# Patient Record
Sex: MALE | Race: WHITE | Employment: OTHER | URBAN - METROPOLITAN AREA
[De-identification: names, ages, dates, MRNs, and addresses within clinical notes are randomized per-mention and may not be internally consistent; named-entity substitution may affect disease eponyms.]

---

## 2017-02-21 ENCOUNTER — TELEPHONE (OUTPATIENT)
Dept: SURGERY | Facility: CLINIC | Age: 62
End: 2017-02-21

## 2017-02-21 NOTE — TELEPHONE ENCOUNTER
Noy Quevedo,     Your last visit with a Registered Dietitian was 4/21/2016 and you are due for follow-up. If you would like to make an appointment with me, please use the attached template or give the clinic a call at [phone].     If you would like to

## 2017-05-15 ENCOUNTER — OFFICE VISIT (OUTPATIENT)
Dept: INTERNAL MEDICINE CLINIC | Facility: CLINIC | Age: 62
End: 2017-05-15

## 2017-05-15 VITALS
TEMPERATURE: 98 F | WEIGHT: 315 LBS | RESPIRATION RATE: 16 BRPM | BODY MASS INDEX: 44.1 KG/M2 | HEIGHT: 71 IN | OXYGEN SATURATION: 98 % | HEART RATE: 67 BPM | DIASTOLIC BLOOD PRESSURE: 80 MMHG | SYSTOLIC BLOOD PRESSURE: 122 MMHG

## 2017-05-15 DIAGNOSIS — I48.92 ATRIAL FLUTTER, UNSPECIFIED TYPE (HCC): ICD-10-CM

## 2017-05-15 DIAGNOSIS — Z79.01 ANTICOAGULATED ON COUMADIN: ICD-10-CM

## 2017-05-15 DIAGNOSIS — Z00.00 ENCOUNTER FOR PREVENTATIVE ADULT HEALTH CARE EXAMINATION: Primary | ICD-10-CM

## 2017-05-15 DIAGNOSIS — S81.801S NON-HEALING WOUND OF LOWER EXTREMITY, RIGHT, SEQUELA: ICD-10-CM

## 2017-05-15 PROCEDURE — G0438 PPPS, INITIAL VISIT: HCPCS | Performed by: INTERNAL MEDICINE

## 2017-05-15 PROCEDURE — 99396 PREV VISIT EST AGE 40-64: CPT | Performed by: INTERNAL MEDICINE

## 2017-05-15 NOTE — PROGRESS NOTES
Quinton Amos is a 62year old male. HPI:   Patient presents with:  Physical  Patient presents for CPX/wellness examination. Last physical was over a year ago. Tries to watch what he eats. Down almost 100 lbs since gastric surgery last year.    Oc tablet, Rfl: 11  •  ergocalciferol 40467 UNITS Oral Cap, Take 1 capsule by mouth every 7 days. Takes on sundays, Disp: , Rfl: 1  •  omeprazole (PRILOSEC) 20 MG Oral Capsule Delayed Release, Take 20 mg by mouth 2 (two) times daily as needed.   , Disp: , Rfl: nourished,in no apparent distress  SKIN: superficial circular wound right hip/thigh area, small amount of drainage  HEENT: atraumatic, PERRLA, EOMI, normal lid and conjunctiva  LUNGS: clear to auscultation bilaterally, no wheezing/rubs  CARDIO: RRR without

## 2017-05-15 NOTE — PATIENT INSTRUCTIONS
- Get your screening blood work done when you are fasting  - Follow up with Dr. Javid Contreras (Vascular Surgery/Wound care) for your surgical wound  - Follow up with Dr. Abe Ruiz as scheduled. It was a pleasure seeing you in the clinic today.   Thank you for lalito

## 2017-05-16 ENCOUNTER — LAB ENCOUNTER (OUTPATIENT)
Dept: LAB | Age: 62
End: 2017-05-16
Attending: INTERNAL MEDICINE
Payer: COMMERCIAL

## 2017-05-16 DIAGNOSIS — Z00.00 ENCOUNTER FOR PREVENTATIVE ADULT HEALTH CARE EXAMINATION: ICD-10-CM

## 2017-05-16 PROCEDURE — 36415 COLL VENOUS BLD VENIPUNCTURE: CPT

## 2017-05-16 PROCEDURE — 85025 COMPLETE CBC W/AUTO DIFF WBC: CPT

## 2017-05-16 PROCEDURE — 80053 COMPREHEN METABOLIC PANEL: CPT

## 2017-05-16 PROCEDURE — 80061 LIPID PANEL: CPT

## 2017-05-16 PROCEDURE — 83036 HEMOGLOBIN GLYCOSYLATED A1C: CPT

## 2017-05-18 ENCOUNTER — TELEPHONE (OUTPATIENT)
Dept: INTERNAL MEDICINE CLINIC | Facility: CLINIC | Age: 62
End: 2017-05-18

## 2017-05-18 DIAGNOSIS — R80.9 MICROALBUMINURIA: Primary | ICD-10-CM

## 2017-05-18 NOTE — TELEPHONE ENCOUNTER
We typically would check that once every twelve months.   The main treatment for the protein in the urine is management of blood glucose (his A1C was normal), monitoring his kidney function (it was normal), control of blood pressure (his blood pressure is n

## 2017-05-18 NOTE — TELEPHONE ENCOUNTER
Pt called inquiring why Microalbumin or (urine test) was not order on last lab orders. Pt inquiring as last test was positive for protein. Please advise.

## 2017-05-25 ENCOUNTER — TELEPHONE (OUTPATIENT)
Dept: SURGERY | Facility: CLINIC | Age: 62
End: 2017-05-25

## 2017-05-25 NOTE — TELEPHONE ENCOUNTER
Spoke with pt regarding 1 yr post up  f/u with dietitian. Pt unable to schedule at time of call; plans to call back to schedule next appontment.

## 2017-05-25 NOTE — TELEPHONE ENCOUNTER
Spoke with pt on 5/22/17 as he is due for his 1 yr RD f/u. Pt did not have his schedule at time of call and requested to call back to reschedule.

## 2017-08-07 RX ORDER — METOPROLOL TARTRATE 100 MG/1
100 TABLET ORAL
Qty: 60 TABLET | Refills: 2 | Status: SHIPPED | OUTPATIENT
Start: 2017-08-07 | End: 2017-12-07

## 2017-10-19 ENCOUNTER — PRIOR ORIGINAL RECORDS (OUTPATIENT)
Dept: OTHER | Age: 62
End: 2017-10-19

## 2017-10-30 ENCOUNTER — LAB ENCOUNTER (OUTPATIENT)
Dept: LAB | Age: 62
End: 2017-10-30
Attending: INTERNAL MEDICINE
Payer: COMMERCIAL

## 2017-10-30 ENCOUNTER — PRIOR ORIGINAL RECORDS (OUTPATIENT)
Dept: OTHER | Age: 62
End: 2017-10-30

## 2017-10-30 DIAGNOSIS — E78.2 MIXED HYPERLIPIDEMIA: Primary | ICD-10-CM

## 2017-10-30 PROCEDURE — 80061 LIPID PANEL: CPT

## 2017-10-30 PROCEDURE — 36415 COLL VENOUS BLD VENIPUNCTURE: CPT

## 2017-10-31 ENCOUNTER — PRIOR ORIGINAL RECORDS (OUTPATIENT)
Dept: OTHER | Age: 62
End: 2017-10-31

## 2017-11-16 LAB
CHOLESTEROL, TOTAL: 172 MG/DL
HDL CHOLESTEROL: 51 MG/DL
LDL CHOLESTEROL: 95 MG/DL
TRIGLYCERIDES: 130 MG/DL

## 2017-12-11 RX ORDER — METOPROLOL TARTRATE 100 MG/1
TABLET ORAL
Qty: 180 TABLET | Refills: 0 | Status: ON HOLD | OUTPATIENT
Start: 2017-12-11 | End: 2018-12-14

## 2017-12-11 NOTE — TELEPHONE ENCOUNTER
Medication passed protocol. Requesting Metoprolol tartrate 100 mg tabs  LOV: 5/15/17 wellness OV with Dr. Syed Hagan  RTC: 6 months  Last Relevant Labs: 5/16/17  Filled: Last sent as 60 with 2 refills on 8/7/17.      Future Appointments  Date Time Provider

## 2017-12-19 ENCOUNTER — OFFICE VISIT (OUTPATIENT)
Dept: INTERNAL MEDICINE CLINIC | Facility: CLINIC | Age: 62
End: 2017-12-19

## 2017-12-19 VITALS
WEIGHT: 315 LBS | RESPIRATION RATE: 20 BRPM | TEMPERATURE: 98 F | DIASTOLIC BLOOD PRESSURE: 76 MMHG | BODY MASS INDEX: 44.1 KG/M2 | HEART RATE: 80 BPM | SYSTOLIC BLOOD PRESSURE: 134 MMHG | HEIGHT: 71 IN

## 2017-12-19 DIAGNOSIS — E66.01 MORBID OBESITY WITH BMI OF 45.0-49.9, ADULT (HCC): ICD-10-CM

## 2017-12-19 DIAGNOSIS — Z98.890 S/P ABLATION OF ATRIAL FLUTTER: ICD-10-CM

## 2017-12-19 DIAGNOSIS — M15.9 PRIMARY OSTEOARTHRITIS INVOLVING MULTIPLE JOINTS: ICD-10-CM

## 2017-12-19 DIAGNOSIS — Z86.79 S/P ABLATION OF ATRIAL FLUTTER: ICD-10-CM

## 2017-12-19 DIAGNOSIS — Z79.01 ANTICOAGULATED ON COUMADIN: ICD-10-CM

## 2017-12-19 DIAGNOSIS — R80.9 POSITIVE FOR MICROALBUMINURIA: ICD-10-CM

## 2017-12-19 DIAGNOSIS — I10 ESSENTIAL HYPERTENSION: Primary | ICD-10-CM

## 2017-12-19 PROBLEM — Z28.21 INFLUENZA VACCINE REFUSED: Status: ACTIVE | Noted: 2017-12-19

## 2017-12-19 PROCEDURE — 99214 OFFICE O/P EST MOD 30 MIN: CPT | Performed by: INTERNAL MEDICINE

## 2017-12-19 NOTE — PROGRESS NOTES
Patient presents with: Follow - Up: 6 month follow up for chronic medical issues      HPI: The pt presents today for 6-month f/u chronic medical conditions as follows:    1. HTN - Stable on prescription medication. No new issues. Due for updated labs. omeprazole (PRILOSEC) 20 MG Oral Capsule Delayed Release, Take 20 mg by mouth 2 (two) times daily as needed.   , Disp: , Rfl:     Smoking status: Former Smoker                                                              Packs/day: 0.10      Years: 0.00 MD  Diplomate, American Board of Internal Medicine  [facility]  [address] [facility],[address], [address], [facility], [address] [place] [address]  T: [phone]; F: [address]       Orders Placed This Encounter      LIPID PANEL - CPX      COMP METABOLIC PANEL - CPX      H

## 2017-12-20 ENCOUNTER — PRIOR ORIGINAL RECORDS (OUTPATIENT)
Dept: OTHER | Age: 62
End: 2017-12-20

## 2017-12-20 ENCOUNTER — APPOINTMENT (OUTPATIENT)
Dept: LAB | Age: 62
End: 2017-12-20
Attending: INTERNAL MEDICINE
Payer: COMMERCIAL

## 2017-12-20 DIAGNOSIS — R80.9 MICROALBUMINURIA: ICD-10-CM

## 2017-12-20 DIAGNOSIS — R80.9 POSITIVE FOR MICROALBUMINURIA: ICD-10-CM

## 2017-12-20 DIAGNOSIS — I10 ESSENTIAL HYPERTENSION: ICD-10-CM

## 2017-12-20 PROCEDURE — 82570 ASSAY OF URINE CREATININE: CPT

## 2017-12-20 PROCEDURE — 80061 LIPID PANEL: CPT

## 2017-12-20 PROCEDURE — 83036 HEMOGLOBIN GLYCOSYLATED A1C: CPT

## 2017-12-20 PROCEDURE — 80053 COMPREHEN METABOLIC PANEL: CPT

## 2017-12-20 PROCEDURE — 36415 COLL VENOUS BLD VENIPUNCTURE: CPT

## 2017-12-20 PROCEDURE — 82043 UR ALBUMIN QUANTITATIVE: CPT

## 2017-12-21 ENCOUNTER — TELEPHONE (OUTPATIENT)
Dept: INTERNAL MEDICINE CLINIC | Facility: CLINIC | Age: 62
End: 2017-12-21

## 2017-12-21 NOTE — TELEPHONE ENCOUNTER
Patient wanted to know about his liver test and they were cleared through my chart by patient. Patient is aware of results and clarified with patient.

## 2018-01-04 ENCOUNTER — HOSPITAL ENCOUNTER (OUTPATIENT)
Dept: CV DIAGNOSTICS | Age: 63
Discharge: HOME | End: 2018-01-04
Attending: INTERNAL MEDICINE
Payer: COMMERCIAL

## 2018-01-04 DIAGNOSIS — I48.91 ATRIAL FIBRILLATION (HCC): ICD-10-CM

## 2018-01-04 DIAGNOSIS — T81.718A IATROGENIC PULMONARY EMBOLISM AND INFARCTION (HCC): ICD-10-CM

## 2018-01-04 DIAGNOSIS — I26.99 IATROGENIC PULMONARY EMBOLISM AND INFARCTION (HCC): ICD-10-CM

## 2018-01-04 LAB — POC INR: 4.8 (ref 0.8–1.3)

## 2018-01-04 PROCEDURE — 85610 PROTHROMBIN TIME: CPT

## 2018-01-05 NOTE — TELEPHONE ENCOUNTER
Ask patient if he'd like to switch to Viagra, as it's now gone generic. Alvaro Cornejo. Amira Lawrence MD  Diplomate, American Board of Internal Medicine  [facility]  [address]  [facility],4Th Floor, [address], [facility], [address] [place] [address]  T: [phone]; F: [address]

## 2018-01-09 ENCOUNTER — HOSPITAL ENCOUNTER (OUTPATIENT)
Dept: LAB | Facility: HOSPITAL | Age: 63
Discharge: HOME | End: 2018-01-09
Attending: INTERNAL MEDICINE
Payer: COMMERCIAL

## 2018-01-09 DIAGNOSIS — I48.91 ATRIAL FIBRILLATION (HCC): ICD-10-CM

## 2018-01-09 DIAGNOSIS — I26.99 IATROGENIC PULMONARY EMBOLISM AND INFARCTION (HCC): ICD-10-CM

## 2018-01-09 DIAGNOSIS — T81.718A IATROGENIC PULMONARY EMBOLISM AND INFARCTION (HCC): ICD-10-CM

## 2018-01-09 LAB — POC INR: 1.3 (ref 0.8–1.3)

## 2018-01-09 PROCEDURE — 85610 PROTHROMBIN TIME: CPT

## 2018-01-10 NOTE — TELEPHONE ENCOUNTER
Tell him generic Viagra sent to pharmacy. Carley Pan. Beba Santo MD  Diplomate, American Board of Internal Medicine  [facility]  [address] [facility],4Th Floor, [address], [facility], [address]  T: [phone]; F: [address]

## 2018-01-12 ENCOUNTER — TELEPHONE (OUTPATIENT)
Dept: INTERNAL MEDICINE CLINIC | Facility: CLINIC | Age: 63
End: 2018-01-12

## 2018-01-12 NOTE — TELEPHONE ENCOUNTER
Done.    Ej Argueta MD  Diplomate, American Board of Internal Medicine  [facility]  [address] [facility],4Th Floor, [address], [facility], [address] [place] [address]  T: [phone]; F: [address]

## 2018-01-12 NOTE — TELEPHONE ENCOUNTER
Pt stating Sildenafil 100 mg is going to cost pt $800.00. Pt requesting  Sildenafil 20 mg tablets #24  2 1/2 tablets as needed and it will cost him $15.00 out of pocket.

## 2018-01-12 NOTE — TELEPHONE ENCOUNTER
Patient would like to speak with nurse in regards to medication. Patient did not explain.  Please call patient back

## 2018-01-15 ENCOUNTER — HOSPITAL ENCOUNTER (OUTPATIENT)
Dept: LAB | Facility: HOSPITAL | Age: 63
Discharge: HOME | End: 2018-01-15
Attending: INTERNAL MEDICINE
Payer: COMMERCIAL

## 2018-01-15 DIAGNOSIS — I26.99 IATROGENIC PULMONARY EMBOLISM AND INFARCTION (HCC): ICD-10-CM

## 2018-01-15 DIAGNOSIS — I48.91 ATRIAL FIBRILLATION (HCC): ICD-10-CM

## 2018-01-15 DIAGNOSIS — T81.718A IATROGENIC PULMONARY EMBOLISM AND INFARCTION (HCC): ICD-10-CM

## 2018-01-15 LAB — POC INR: 1.9 (ref 0.8–1.3)

## 2018-01-15 PROCEDURE — 85610 PROTHROMBIN TIME: CPT

## 2018-01-24 ENCOUNTER — PRIOR ORIGINAL RECORDS (OUTPATIENT)
Dept: OTHER | Age: 63
End: 2018-01-24

## 2018-01-25 ENCOUNTER — HOSPITAL ENCOUNTER (OUTPATIENT)
Dept: LAB | Facility: HOSPITAL | Age: 63
Discharge: HOME | End: 2018-01-25
Attending: INTERNAL MEDICINE
Payer: COMMERCIAL

## 2018-01-25 DIAGNOSIS — I48.91 ATRIAL FIBRILLATION (HCC): ICD-10-CM

## 2018-01-25 DIAGNOSIS — T81.718A IATROGENIC PULMONARY EMBOLISM AND INFARCTION (HCC): ICD-10-CM

## 2018-01-25 DIAGNOSIS — I26.99 IATROGENIC PULMONARY EMBOLISM AND INFARCTION (HCC): ICD-10-CM

## 2018-01-25 LAB — POC INR: 4.6 (ref 0.8–1.3)

## 2018-01-25 PROCEDURE — 85610 PROTHROMBIN TIME: CPT

## 2018-02-12 ENCOUNTER — HOSPITAL ENCOUNTER (OUTPATIENT)
Dept: LAB | Facility: HOSPITAL | Age: 63
Discharge: HOME | End: 2018-02-12
Attending: INTERNAL MEDICINE
Payer: COMMERCIAL

## 2018-02-12 DIAGNOSIS — I26.99 IATROGENIC PULMONARY EMBOLISM AND INFARCTION (HCC): ICD-10-CM

## 2018-02-12 DIAGNOSIS — T81.718A IATROGENIC PULMONARY EMBOLISM AND INFARCTION (HCC): ICD-10-CM

## 2018-02-12 DIAGNOSIS — I48.91 ATRIAL FIBRILLATION (HCC): ICD-10-CM

## 2018-02-12 LAB — POC INR: 2.2 (ref 0.8–1.3)

## 2018-02-12 PROCEDURE — 85610 PROTHROMBIN TIME: CPT

## 2018-02-22 DIAGNOSIS — F52.8 PSYCHOSEXUAL DYSFUNCTION WITH INHIBITED SEXUAL EXCITEMENT: ICD-10-CM

## 2018-02-22 RX ORDER — SILDENAFIL CITRATE 20 MG/1
50 TABLET ORAL
Qty: 24 TABLET | Refills: 0 | Status: CANCELLED | OUTPATIENT
Start: 2018-02-22

## 2018-02-23 ENCOUNTER — PATIENT MESSAGE (OUTPATIENT)
Dept: INTERNAL MEDICINE CLINIC | Facility: CLINIC | Age: 63
End: 2018-02-23

## 2018-02-26 NOTE — TELEPHONE ENCOUNTER
From: Nicho Franco  To: Carlos Hassan MD  Sent: 2/23/2018 5:43 PM CST  Subject: Other    Doctor Cathryn Kumar, I have tried three days in a row, Feb. 21, Feb.22, and today to have a renewal of my prescription for Sildenafil (20mg).  I have spoken with assistants

## 2018-02-28 NOTE — TELEPHONE ENCOUNTER
Call placed to pt and he stated he has been waiting for a written Rx for Sildenafil. Willing to use a [address] It was explained to pt Dr Clarisse Severino is out of the office today and the message would be sent to him for Rx pick-up tomorrow.

## 2018-03-01 ENCOUNTER — TELEPHONE (OUTPATIENT)
Dept: INTERNAL MEDICINE CLINIC | Facility: CLINIC | Age: 63
End: 2018-03-01

## 2018-03-01 NOTE — TELEPHONE ENCOUNTER
Script in my Outbox. Jyl Aid. Lissett Ward MD  Diplomate, American Board of Internal Medicine  [facility]  [address], [facility] & [facility], [address]  T: [phone]; F: [address]

## 2018-03-05 ENCOUNTER — HOSPITAL ENCOUNTER (OUTPATIENT)
Dept: LAB | Facility: HOSPITAL | Age: 63
Discharge: HOME | End: 2018-03-05
Attending: INTERNAL MEDICINE
Payer: COMMERCIAL

## 2018-03-05 DIAGNOSIS — T81.718A IATROGENIC PULMONARY EMBOLISM AND INFARCTION (HCC): ICD-10-CM

## 2018-03-05 DIAGNOSIS — I48.91 ATRIAL FIBRILLATION (HCC): ICD-10-CM

## 2018-03-05 DIAGNOSIS — I26.99 IATROGENIC PULMONARY EMBOLISM AND INFARCTION (HCC): ICD-10-CM

## 2018-03-05 LAB — POC INR: 2.9 (ref 0.8–1.3)

## 2018-03-05 PROCEDURE — 85610 PROTHROMBIN TIME: CPT

## 2018-04-13 ENCOUNTER — HOSPITAL ENCOUNTER (OUTPATIENT)
Dept: LAB | Facility: HOSPITAL | Age: 63
Discharge: HOME | End: 2018-04-13
Attending: INTERNAL MEDICINE
Payer: COMMERCIAL

## 2018-04-13 DIAGNOSIS — I26.99 IATROGENIC PULMONARY EMBOLISM AND INFARCTION (HCC): ICD-10-CM

## 2018-04-13 DIAGNOSIS — T81.718A IATROGENIC PULMONARY EMBOLISM AND INFARCTION (HCC): ICD-10-CM

## 2018-04-13 DIAGNOSIS — I48.91 ATRIAL FIBRILLATION (HCC): ICD-10-CM

## 2018-04-13 PROCEDURE — 85610 PROTHROMBIN TIME: CPT

## 2018-04-13 PROCEDURE — 36415 COLL VENOUS BLD VENIPUNCTURE: CPT

## 2018-04-25 ENCOUNTER — HOSPITAL ENCOUNTER (OUTPATIENT)
Dept: LAB | Facility: HOSPITAL | Age: 63
Discharge: HOME | End: 2018-04-25
Attending: INTERNAL MEDICINE
Payer: COMMERCIAL

## 2018-04-25 DIAGNOSIS — T81.718A IATROGENIC PULMONARY EMBOLISM AND INFARCTION (HCC): ICD-10-CM

## 2018-04-25 DIAGNOSIS — I48.91 ATRIAL FIBRILLATION (HCC): ICD-10-CM

## 2018-04-25 DIAGNOSIS — I26.99 IATROGENIC PULMONARY EMBOLISM AND INFARCTION (HCC): ICD-10-CM

## 2018-04-25 PROCEDURE — 85610 PROTHROMBIN TIME: CPT

## 2018-05-08 ENCOUNTER — HOSPITAL ENCOUNTER (OUTPATIENT)
Dept: LAB | Facility: HOSPITAL | Age: 63
Discharge: HOME | End: 2018-05-08
Attending: INTERNAL MEDICINE
Payer: COMMERCIAL

## 2018-05-08 DIAGNOSIS — I26.99 IATROGENIC PULMONARY EMBOLISM AND INFARCTION (HCC): ICD-10-CM

## 2018-05-08 DIAGNOSIS — I48.91 ATRIAL FIBRILLATION (HCC): ICD-10-CM

## 2018-05-08 DIAGNOSIS — T81.718A IATROGENIC PULMONARY EMBOLISM AND INFARCTION (HCC): ICD-10-CM

## 2018-05-08 PROCEDURE — 85610 PROTHROMBIN TIME: CPT

## 2018-05-15 ENCOUNTER — HOSPITAL ENCOUNTER (OUTPATIENT)
Dept: LAB | Facility: HOSPITAL | Age: 63
Discharge: HOME | End: 2018-05-15
Attending: INTERNAL MEDICINE
Payer: COMMERCIAL

## 2018-05-15 DIAGNOSIS — I26.99 IATROGENIC PULMONARY EMBOLISM AND INFARCTION (HCC): ICD-10-CM

## 2018-05-15 DIAGNOSIS — I48.91 ATRIAL FIBRILLATION (HCC): ICD-10-CM

## 2018-05-15 DIAGNOSIS — T81.718A IATROGENIC PULMONARY EMBOLISM AND INFARCTION (HCC): ICD-10-CM

## 2018-05-15 PROCEDURE — 85610 PROTHROMBIN TIME: CPT

## 2018-05-17 ENCOUNTER — TELEPHONE (OUTPATIENT)
Dept: SURGERY | Facility: CLINIC | Age: 63
End: 2018-05-17

## 2018-05-17 NOTE — TELEPHONE ENCOUNTER
Left reminder message for patient to schedule 2 year post-op follow up with bariatric surgeon, bariatrician and dietitian.

## 2018-06-07 ENCOUNTER — HOSPITAL ENCOUNTER (OUTPATIENT)
Dept: LAB | Facility: HOSPITAL | Age: 63
Discharge: HOME | End: 2018-06-07
Attending: INTERNAL MEDICINE
Payer: COMMERCIAL

## 2018-06-07 DIAGNOSIS — I26.99 IATROGENIC PULMONARY EMBOLISM AND INFARCTION (HCC): ICD-10-CM

## 2018-06-07 DIAGNOSIS — I48.91 ATRIAL FIBRILLATION (HCC): ICD-10-CM

## 2018-06-07 DIAGNOSIS — T81.718A IATROGENIC PULMONARY EMBOLISM AND INFARCTION (HCC): ICD-10-CM

## 2018-06-07 PROCEDURE — 85610 PROTHROMBIN TIME: CPT

## 2018-06-28 ENCOUNTER — HOSPITAL ENCOUNTER (OUTPATIENT)
Dept: LAB | Facility: HOSPITAL | Age: 63
Discharge: HOME | End: 2018-06-28
Attending: INTERNAL MEDICINE
Payer: COMMERCIAL

## 2018-06-28 DIAGNOSIS — I26.99 IATROGENIC PULMONARY EMBOLISM AND INFARCTION (HCC): ICD-10-CM

## 2018-06-28 DIAGNOSIS — T81.718A IATROGENIC PULMONARY EMBOLISM AND INFARCTION (HCC): ICD-10-CM

## 2018-06-28 DIAGNOSIS — I48.91 ATRIAL FIBRILLATION (HCC): ICD-10-CM

## 2018-06-28 LAB — POC INR: 2.7 (ref 0.8–1.3)

## 2018-06-28 PROCEDURE — 85610 PROTHROMBIN TIME: CPT

## 2018-07-13 ENCOUNTER — PRIOR ORIGINAL RECORDS (OUTPATIENT)
Dept: OTHER | Age: 63
End: 2018-07-13

## 2018-07-17 ENCOUNTER — PRIOR ORIGINAL RECORDS (OUTPATIENT)
Dept: OTHER | Age: 63
End: 2018-07-17

## 2018-07-17 ENCOUNTER — TELEPHONE (OUTPATIENT)
Dept: INTERNAL MEDICINE CLINIC | Facility: CLINIC | Age: 63
End: 2018-07-17

## 2018-07-17 DIAGNOSIS — Z79.01 ANTICOAGULATED ON COUMADIN: ICD-10-CM

## 2018-07-17 DIAGNOSIS — I48.92 ATRIAL FLUTTER, UNSPECIFIED TYPE (HCC): Primary | ICD-10-CM

## 2018-07-17 LAB
ALBUMIN: 3.4 G/DL
ALKALINE PHOSPHATATE(ALK PHOS): 93 IU/L
BILIRUBIN TOTAL: 0.5 MG/DL
BUN: 13 MG/DL
CALCIUM: 9.2 MG/DL
CHLORIDE: 105 MEQ/L
CHOLESTEROL, TOTAL: 204 MG/DL
CREATININE, SERUM: 0.71 MG/DL
GLUCOSE: 96 MG/DL
HDL CHOLESTEROL: 44 MG/DL
HEMOGLOBIN A1C: 6 %
LDL CHOLESTEROL: 88 MG/DL
POTASSIUM, SERUM: 4 MEQ/L
PROTEIN, TOTAL: 7.7 G/DL
SGOT (AST): 14 IU/L
SGPT (ALT): 23 IU/L
SODIUM: 140 MEQ/L
TRIGLYCERIDES: 360 MG/DL

## 2018-07-17 NOTE — TELEPHONE ENCOUNTER
Patient calling in requesting a referral to see Cardiologist, Dr. Alessandra Patterson. Patient is at the office now and was not aware his referral had . Please place referral entry. Call pt with any questions.

## 2018-07-18 ENCOUNTER — PRIOR ORIGINAL RECORDS (OUTPATIENT)
Dept: OTHER | Age: 63
End: 2018-07-18

## 2018-07-18 ENCOUNTER — LAB ENCOUNTER (OUTPATIENT)
Dept: LAB | Age: 63
End: 2018-07-18
Attending: INTERNAL MEDICINE
Payer: COMMERCIAL

## 2018-07-18 DIAGNOSIS — I48.91 ATRIAL FIBRILLATION (HCC): ICD-10-CM

## 2018-07-18 DIAGNOSIS — I10 ESSENTIAL HYPERTENSION, MALIGNANT: Primary | ICD-10-CM

## 2018-07-18 LAB
BASOPHILS # BLD AUTO: 0.05 X10(3) UL (ref 0–0.1)
BASOPHILS NFR BLD AUTO: 0.6 %
BUN BLD-MCNC: 10 MG/DL (ref 8–20)
CALCIUM BLD-MCNC: 8.8 MG/DL (ref 8.3–10.3)
CHLORIDE: 106 MMOL/L (ref 101–111)
CO2: 24 MMOL/L (ref 22–32)
CREAT BLD-MCNC: 0.92 MG/DL (ref 0.7–1.3)
EOSINOPHIL # BLD AUTO: 0.15 X10(3) UL (ref 0–0.3)
EOSINOPHIL NFR BLD AUTO: 1.9 %
ERYTHROCYTE [DISTWIDTH] IN BLOOD BY AUTOMATED COUNT: 15.8 % (ref 11.5–16)
GLUCOSE BLD-MCNC: 101 MG/DL (ref 70–99)
HAV IGM SER QL: 2.1 MG/DL (ref 1.8–2.5)
HCT VFR BLD AUTO: 41.8 % (ref 37–53)
HGB BLD-MCNC: 12.5 G/DL (ref 13–17)
IMMATURE GRANULOCYTE COUNT: 0.02 X10(3) UL (ref 0–1)
IMMATURE GRANULOCYTE RATIO %: 0.3 %
LYMPHOCYTES # BLD AUTO: 1.16 X10(3) UL (ref 0.9–4)
LYMPHOCYTES NFR BLD AUTO: 14.9 %
MCH RBC QN AUTO: 26.7 PG (ref 27–33.2)
MCHC RBC AUTO-ENTMCNC: 29.9 G/DL (ref 31–37)
MCV RBC AUTO: 89.3 FL (ref 80–99)
MONOCYTES # BLD AUTO: 0.66 X10(3) UL (ref 0.1–1)
MONOCYTES NFR BLD AUTO: 8.5 %
NEUTROPHIL ABS PRELIM: 5.76 X10 (3) UL (ref 1.3–6.7)
NEUTROPHILS # BLD AUTO: 5.76 X10(3) UL (ref 1.3–6.7)
NEUTROPHILS NFR BLD AUTO: 73.8 %
PLATELET # BLD AUTO: 481 10(3)UL (ref 150–450)
POTASSIUM SERPL-SCNC: 4.1 MMOL/L (ref 3.6–5.1)
RBC # BLD AUTO: 4.68 X10(6)UL (ref 4.3–5.7)
RED CELL DISTRIBUTION WIDTH-SD: 51.1 FL (ref 35.1–46.3)
SODIUM SERPL-SCNC: 139 MMOL/L (ref 136–144)
TSI SER-ACNC: 1.27 MIU/ML (ref 0.35–5.5)
WBC # BLD AUTO: 7.8 X10(3) UL (ref 4–13)

## 2018-07-18 PROCEDURE — 84443 ASSAY THYROID STIM HORMONE: CPT

## 2018-07-18 PROCEDURE — 85025 COMPLETE CBC W/AUTO DIFF WBC: CPT

## 2018-07-18 PROCEDURE — 80048 BASIC METABOLIC PNL TOTAL CA: CPT

## 2018-07-18 PROCEDURE — 83735 ASSAY OF MAGNESIUM: CPT

## 2018-07-18 PROCEDURE — 36415 COLL VENOUS BLD VENIPUNCTURE: CPT

## 2018-07-20 ENCOUNTER — PRIOR ORIGINAL RECORDS (OUTPATIENT)
Dept: OTHER | Age: 63
End: 2018-07-20

## 2018-07-20 LAB
BUN: 10 MG/DL
CALCIUM: 8.8 MG/DL
CHLORIDE: 106 MEQ/L
CREATININE, SERUM: 0.92 MG/DL
GLUCOSE: 101 MG/DL
HEMATOCRIT: 41.8 %
HEMOGLOBIN: 12.5 G/DL
MAGNESIUM: 2.1 MG/DL
PLATELETS: 481 K/UL
POTASSIUM, SERUM: 4.1 MEQ/L
RED BLOOD COUNT: 4.68 X 10-6/U
SODIUM: 139 MEQ/L
THYROID STIMULATING HORMONE: 1.27 MLU/L
WHITE BLOOD COUNT: 7.8 X 10-3/U

## 2018-07-30 ENCOUNTER — APPOINTMENT (OUTPATIENT)
Dept: GENERAL RADIOLOGY | Age: 63
End: 2018-07-30
Attending: EMERGENCY MEDICINE
Payer: COMMERCIAL

## 2018-07-30 ENCOUNTER — HOSPITAL ENCOUNTER (EMERGENCY)
Facility: HOSPITAL | Age: 63
Discharge: HOME | End: 2018-07-30
Attending: EMERGENCY MEDICINE
Payer: COMMERCIAL

## 2018-07-30 ENCOUNTER — APPOINTMENT (OUTPATIENT)
Dept: CT IMAGING | Facility: HOSPITAL | Age: 63
End: 2018-07-30
Attending: EMERGENCY MEDICINE
Payer: COMMERCIAL

## 2018-07-30 ENCOUNTER — TELEPHONE (OUTPATIENT)
Dept: INTERNAL MEDICINE CLINIC | Facility: CLINIC | Age: 63
End: 2018-07-30

## 2018-07-30 ENCOUNTER — HOSPITAL ENCOUNTER (OUTPATIENT)
Age: 63
Discharge: ANOTHER HOSPITAL | End: 2018-07-30
Attending: EMERGENCY MEDICINE
Payer: COMMERCIAL

## 2018-07-30 VITALS
TEMPERATURE: 98 F | DIASTOLIC BLOOD PRESSURE: 78 MMHG | RESPIRATION RATE: 28 BRPM | OXYGEN SATURATION: 95 % | SYSTOLIC BLOOD PRESSURE: 129 MMHG | HEART RATE: 96 BPM

## 2018-07-30 VITALS
BODY MASS INDEX: 44.1 KG/M2 | DIASTOLIC BLOOD PRESSURE: 83 MMHG | OXYGEN SATURATION: 93 % | RESPIRATION RATE: 27 BRPM | SYSTOLIC BLOOD PRESSURE: 134 MMHG | WEIGHT: 315 LBS | HEART RATE: 101 BPM | TEMPERATURE: 98 F | HEIGHT: 71 IN

## 2018-07-30 DIAGNOSIS — I48.92 ATRIAL FIBRILLATION AND FLUTTER (HCC): ICD-10-CM

## 2018-07-30 DIAGNOSIS — J18.9 PNEUMONIA OF BOTH LUNGS DUE TO INFECTIOUS ORGANISM, UNSPECIFIED PART OF LUNG: Primary | ICD-10-CM

## 2018-07-30 DIAGNOSIS — I48.91 ATRIAL FIBRILLATION AND FLUTTER (HCC): ICD-10-CM

## 2018-07-30 DIAGNOSIS — J18.9 COMMUNITY ACQUIRED PNEUMONIA OF RIGHT LUNG, UNSPECIFIED PART OF LUNG: Primary | ICD-10-CM

## 2018-07-30 LAB
#LYMPHOCYTE IC: 1 X10ˆ3/UL (ref 0.9–3.2)
#MXD IC: 1 X10ˆ3/UL (ref 0.1–1)
#NEUTROPHIL IC: 7.7 X10ˆ3/UL (ref 1.3–6.7)
ALBUMIN SERPL-MCNC: 2.9 G/DL (ref 3.5–4.8)
ALBUMIN/GLOB SERPL: 0.6 {RATIO} (ref 1–2)
ALP LIVER SERPL-CCNC: 158 U/L (ref 45–117)
ALT SERPL-CCNC: 18 U/L (ref 17–63)
ANION GAP SERPL CALC-SCNC: 9 MMOL/L (ref 0–18)
APTT PPP: 51.2 SECONDS (ref 26.1–34.6)
AST SERPL-CCNC: 16 U/L (ref 15–41)
ATRIAL RATE: 86 BPM
BASOPHILS # BLD AUTO: 0.07 X10(3) UL (ref 0–0.1)
BASOPHILS NFR BLD AUTO: 0.7 %
BILIRUB SERPL-MCNC: 1 MG/DL (ref 0.1–2)
BILIRUB UR QL STRIP.AUTO: NEGATIVE
BUN BLD-MCNC: 13 MG/DL (ref 8–20)
BUN/CREAT SERPL: 16.9 (ref 10–20)
CALCIUM BLD-MCNC: 8.7 MG/DL (ref 8.3–10.3)
CHLORIDE SERPL-SCNC: 103 MMOL/L (ref 101–111)
CLARITY UR REFRACT.AUTO: CLEAR
CO2 SERPL-SCNC: 25 MMOL/L (ref 22–32)
COLOR UR AUTO: YELLOW
CREAT BLD-MCNC: 0.77 MG/DL (ref 0.7–1.3)
CREAT SERPL-MCNC: 0.6 MG/DL (ref 0.7–1.3)
DDIMER WHOLE BLOOD: 394 NG/ML DDU (ref ?–400)
EOSINOPHIL # BLD AUTO: 0.18 X10(3) UL (ref 0–0.3)
EOSINOPHIL NFR BLD AUTO: 1.9 %
ERYTHROCYTE [DISTWIDTH] IN BLOOD BY AUTOMATED COUNT: 15.7 % (ref 11.5–16)
GLOBULIN PLAS-MCNC: 5.1 G/DL (ref 2.5–3.7)
GLUCOSE BLD-MCNC: 103 MG/DL (ref 70–99)
GLUCOSE BLD-MCNC: 120 MG/DL (ref 70–99)
GLUCOSE UR STRIP.AUTO-MCNC: NEGATIVE MG/DL
HCT IC: 38.5 % (ref 37–54)
HCT VFR BLD AUTO: 38.3 % (ref 37–53)
HGB BLD-MCNC: 11.7 G/DL (ref 13–17)
HGB IC: 11.9 G/DL (ref 13–17)
IMMATURE GRANULOCYTE COUNT: 0.03 X10(3) UL (ref 0–1)
IMMATURE GRANULOCYTE RATIO %: 0.3 %
INR BLD: 2.45 (ref 0.9–1.1)
ISTAT BLOOD GAS TCO2: 24 MMOL/L (ref 22–32)
ISTAT BUN: 13 MG/DL (ref 8–20)
ISTAT CHLORIDE: 102 MMOL/L (ref 101–111)
ISTAT HEMATOCRIT: 38 % (ref 37–53)
ISTAT IONIZED CALCIUM: 0.97 MMOL/L
ISTAT POTASSIUM: 3.9 MMOL/L (ref 3.6–5.1)
ISTAT SODIUM: 137 MMOL/L (ref 136–144)
ISTAT TROPONIN: <0.1 NG/ML (ref ?–0.1)
KETONES UR STRIP.AUTO-MCNC: NEGATIVE MG/DL
LEUKOCYTE ESTERASE UR QL STRIP.AUTO: NEGATIVE
LYMPHOCYTES # BLD AUTO: 0.94 X10(3) UL (ref 0.9–4)
LYMPHOCYTES NFR BLD AUTO: 10 %
LYMPHOCYTES NFR BLD AUTO: 10.2 %
M PROTEIN MFR SERPL ELPH: 8 G/DL (ref 6.1–8.3)
MCH IC: 27 PG (ref 27–33.2)
MCH RBC QN AUTO: 26.3 PG (ref 27–33.2)
MCHC IC: 30.9 G/DL (ref 31–37)
MCHC RBC AUTO-ENTMCNC: 30.5 G/DL (ref 31–37)
MCV IC: 87.5 FL (ref 80–99)
MCV RBC AUTO: 86.1 FL (ref 80–99)
MIXED CELL %: 10 %
MONOCYTES # BLD AUTO: 0.75 X10(3) UL (ref 0.1–1)
MONOCYTES NFR BLD AUTO: 8 %
NEUTROPHIL ABS PRELIM: 7.39 X10 (3) UL (ref 1.3–6.7)
NEUTROPHILS # BLD AUTO: 7.39 X10(3) UL (ref 1.3–6.7)
NEUTROPHILS NFR BLD AUTO: 79.1 %
NEUTROPHILS NFR BLD AUTO: 79.8 %
NITRITE UR QL STRIP.AUTO: NEGATIVE
OSMOLALITY SERPL CALC.SUM OF ELEC: 284 MOSM/KG (ref 275–295)
PH UR STRIP.AUTO: 7 [PH] (ref 4.5–8)
PLATELET # BLD AUTO: 372 10(3)UL (ref 150–450)
POTASSIUM SERPL-SCNC: 4.1 MMOL/L (ref 3.6–5.1)
PROCALCITONIN SERPL-MCNC: <0.11 NG/ML
PROT UR STRIP.AUTO-MCNC: 30 MG/DL
PSA SERPL DL<=0.01 NG/ML-MCNC: 27.4 SECONDS (ref 12.4–14.7)
Q-T INTERVAL: 396 MS
QRS DURATION: 154 MS
QTC CALCULATION (BEZET): 479 MS
R AXIS: 12 DEGREES
RBC # BLD AUTO: 4.45 X10(6)UL (ref 4.3–5.7)
RBC IC: 4.4 X10ˆ6/UL (ref 4.3–5.7)
RBC UR QL AUTO: NEGATIVE
RED CELL DISTRIBUTION WIDTH-SD: 48.9 FL (ref 35.1–46.3)
SODIUM SERPL-SCNC: 137 MMOL/L (ref 136–144)
SP GR UR STRIP.AUTO: 1.01 (ref 1–1.03)
T AXIS: -23 DEGREES
UROBILINOGEN UR STRIP.AUTO-MCNC: 2 MG/DL
VENTRICULAR RATE: 88 BPM
WBC # BLD AUTO: 9.4 X10(3) UL (ref 4–13)
WBC IC: 9.7 X10ˆ3/UL (ref 4–13)

## 2018-07-30 PROCEDURE — 93005 ELECTROCARDIOGRAM TRACING: CPT

## 2018-07-30 PROCEDURE — 80047 BASIC METABLC PNL IONIZED CA: CPT

## 2018-07-30 PROCEDURE — 85730 THROMBOPLASTIN TIME PARTIAL: CPT | Performed by: EMERGENCY MEDICINE

## 2018-07-30 PROCEDURE — 84145 PROCALCITONIN (PCT): CPT | Performed by: EMERGENCY MEDICINE

## 2018-07-30 PROCEDURE — 94640 AIRWAY INHALATION TREATMENT: CPT

## 2018-07-30 PROCEDURE — 84484 ASSAY OF TROPONIN QUANT: CPT

## 2018-07-30 PROCEDURE — 99285 EMERGENCY DEPT VISIT HI MDM: CPT

## 2018-07-30 PROCEDURE — 85025 COMPLETE CBC W/AUTO DIFF WBC: CPT | Performed by: EMERGENCY MEDICINE

## 2018-07-30 PROCEDURE — 85378 FIBRIN DEGRADE SEMIQUANT: CPT | Performed by: EMERGENCY MEDICINE

## 2018-07-30 PROCEDURE — 36415 COLL VENOUS BLD VENIPUNCTURE: CPT

## 2018-07-30 PROCEDURE — 87040 BLOOD CULTURE FOR BACTERIA: CPT | Performed by: EMERGENCY MEDICINE

## 2018-07-30 PROCEDURE — 71275 CT ANGIOGRAPHY CHEST: CPT | Performed by: EMERGENCY MEDICINE

## 2018-07-30 PROCEDURE — 71046 X-RAY EXAM CHEST 2 VIEWS: CPT | Performed by: EMERGENCY MEDICINE

## 2018-07-30 PROCEDURE — 81001 URINALYSIS AUTO W/SCOPE: CPT | Performed by: EMERGENCY MEDICINE

## 2018-07-30 PROCEDURE — 99215 OFFICE O/P EST HI 40 MIN: CPT

## 2018-07-30 PROCEDURE — 80053 COMPREHEN METABOLIC PANEL: CPT | Performed by: EMERGENCY MEDICINE

## 2018-07-30 PROCEDURE — 85610 PROTHROMBIN TIME: CPT | Performed by: EMERGENCY MEDICINE

## 2018-07-30 RX ORDER — LEVOFLOXACIN 500 MG/1
500 TABLET, FILM COATED ORAL DAILY
Qty: 10 TABLET | Refills: 0 | Status: SHIPPED | OUTPATIENT
Start: 2018-07-30 | End: 2018-08-09

## 2018-07-30 RX ORDER — IPRATROPIUM BROMIDE AND ALBUTEROL SULFATE 2.5; .5 MG/3ML; MG/3ML
3 SOLUTION RESPIRATORY (INHALATION) ONCE
Status: COMPLETED | OUTPATIENT
Start: 2018-07-30 | End: 2018-07-30

## 2018-07-30 NOTE — ED INITIAL ASSESSMENT (HPI)
Pt began with a chest cold 1 month ago, went to [facility], had issues with breathing, dizziness, and has history of cardiac issues.   Pt felt better when he returned home, but past 2 days it has gotten worse and he has a fever,   Saw cardiologist 1 week ago an

## 2018-07-30 NOTE — TELEPHONE ENCOUNTER
Spouse dropped off form to be completed by Physician.     Title of form: Certification for Parking Placard/License Plates

## 2018-07-30 NOTE — ED PROVIDER NOTES
Patient Seen in: [facility] Emergency Department    History   Patient presents with:  Cough    Stated Complaint: pneumonia from [facility]    HPI    18-year-old male presents emergency department who was sent from [address] with a with erosions/ulcers                immediately above GEJ (biopsies for intestinal                metaplasia taken w/out barretts)  No date: UPPER GI ENDOSCOPY,EXAM        Smoking status: Former Smoker All other components within normal limits   PTT, ACTIVATED - Abnormal; Notable for the following:     PTT 51.2 (*)     All other components within normal limits   PROTHROMBIN TIME (PT) - Abnormal; Notable for the following:     PT 27.4 (*)     INR 2.45 (*) 1456  ------------------------------------------------------------    Pulmonologist looked at the scan. This point he would like me to try a 10 day course of Levaquin. Patient will follow up with primary physician may need a repeat chest x-ray.   Patient

## 2018-07-30 NOTE — IMAGING NOTE
Patient presents to CT with a 20g right upper arm IV which is in the proximal upper half of the right arm. After injecting the patient for CT (6.0ml/sec), the area distal to the IV catheter is bruised.  I flushed it again with saline and did not get a good

## 2018-07-30 NOTE — ED INITIAL ASSESSMENT (HPI)
Patient sent from the [facility] with a diagnosis of pneumonia. Patient reports a nonproductive cough since July 1. He reports he was in [facility] and got back on August 12. He states the cough feels heavier since 4 or 5 days ago.  He states he had a fever of 100.7

## 2018-08-02 NOTE — TELEPHONE ENCOUNTER
Patient was upset that his paperwork has not been completed as yet. Stated he is paying for his health insurance and is not on welfare. Inquired why it could potentially take 7-14 business days when all the Dr needed to do was just sign the form.   This was

## 2018-08-07 NOTE — TELEPHONE ENCOUNTER
Called and left message informing him paperwork is ready for  and will be placed in the front of the office. Another copy was mailed to . Copy in blue folder.

## 2018-08-13 ENCOUNTER — HOSPITAL ENCOUNTER (OUTPATIENT)
Dept: LAB | Facility: HOSPITAL | Age: 63
Discharge: HOME | End: 2018-08-13
Attending: INTERNAL MEDICINE
Payer: COMMERCIAL

## 2018-08-13 LAB
INR BLD: 2.22 (ref 0.9–1.1)
PSA SERPL DL<=0.01 NG/ML-MCNC: 24.2 SECONDS (ref 12–14.1)

## 2018-08-13 PROCEDURE — 85610 PROTHROMBIN TIME: CPT | Performed by: INTERNAL MEDICINE

## 2018-08-13 PROCEDURE — 36415 COLL VENOUS BLD VENIPUNCTURE: CPT | Performed by: INTERNAL MEDICINE

## 2018-08-16 ENCOUNTER — OFFICE VISIT (OUTPATIENT)
Dept: INTERNAL MEDICINE CLINIC | Facility: CLINIC | Age: 63
End: 2018-08-16

## 2018-08-16 VITALS
DIASTOLIC BLOOD PRESSURE: 78 MMHG | WEIGHT: 315 LBS | TEMPERATURE: 98 F | BODY MASS INDEX: 44.1 KG/M2 | SYSTOLIC BLOOD PRESSURE: 112 MMHG | HEIGHT: 71 IN | HEART RATE: 82 BPM | OXYGEN SATURATION: 96 %

## 2018-08-16 DIAGNOSIS — J18.9 COMMUNITY ACQUIRED PNEUMONIA OF RIGHT MIDDLE LOBE OF LUNG: ICD-10-CM

## 2018-08-16 DIAGNOSIS — Z00.00 ROUTINE GENERAL MEDICAL EXAMINATION AT A HEALTH CARE FACILITY: Primary | ICD-10-CM

## 2018-08-16 DIAGNOSIS — I48.19 PERSISTENT ATRIAL FIBRILLATION (HCC): ICD-10-CM

## 2018-08-16 PROBLEM — Z98.890 S/P ABLATION OF ATRIAL FLUTTER: Status: RESOLVED | Noted: 2017-12-19 | Resolved: 2018-08-16

## 2018-08-16 PROBLEM — Z86.79 S/P ABLATION OF ATRIAL FLUTTER: Status: RESOLVED | Noted: 2017-12-19 | Resolved: 2018-08-16

## 2018-08-16 PROBLEM — E66.01 MORBID OBESITY WITH BMI OF 45.0-49.9, ADULT (HCC): Status: RESOLVED | Noted: 2017-12-19 | Resolved: 2018-08-16

## 2018-08-16 PROCEDURE — 99396 PREV VISIT EST AGE 40-64: CPT | Performed by: INTERNAL MEDICINE

## 2018-08-16 NOTE — PROGRESS NOTES
Patient presents with:  Physical      HPI: Jeni Oneill presents today for annual physical.  Due for select wellness labs. Health goals still center around longevity, vitality, and quality of life.   He was diagnosed w/ recent pneumonia and is due for surveillan Rfl: 0  •  METOPROLOL TARTRATE 100 MG Oral Tab, TAKE ONE TABLET BY MOUTH TWICE DAILY, Disp: 180 tablet, Rfl: 0  •  AmLODIPine Besylate 10 MG Oral Tab, Take 10 mg by mouth daily. , Disp: , Rfl:   •  LISINOPRIL 40 MG Oral Tab, TAKE ONE TABLET BY MOUTH ONCE DA 2+ DTRs  Psych - nl mood/affect      A/P:  Routine general medical examination at a health care facility  (primary encounter diagnosis)  Persistent atrial fibrillation (hcc) - dr. Heránn Mathis acquired pneumonia of right middle lobe of lung (hcc)    1.  Mare Muñoz

## 2018-08-16 NOTE — PATIENT INSTRUCTIONS
Lynn Gottlieb,    1. Get Chest X-ray done for pneumonia surveillance, aka making sure it's gone. 2. Get labs done in the fasting state. 3. Continue to follow with Dr. Xuan Cotton regarding the Atrial Fibrillation. 4. Keep taking medications as directed.   5. We'll conn

## 2018-08-17 ENCOUNTER — PRIOR ORIGINAL RECORDS (OUTPATIENT)
Dept: OTHER | Age: 63
End: 2018-08-17

## 2018-08-17 ENCOUNTER — LAB ENCOUNTER (OUTPATIENT)
Dept: LAB | Age: 63
End: 2018-08-17
Attending: INTERNAL MEDICINE
Payer: COMMERCIAL

## 2018-08-17 ENCOUNTER — HOSPITAL ENCOUNTER (OUTPATIENT)
Dept: GENERAL RADIOLOGY | Age: 63
Discharge: HOME | End: 2018-08-17
Attending: INTERNAL MEDICINE
Payer: COMMERCIAL

## 2018-08-17 DIAGNOSIS — J18.9 COMMUNITY ACQUIRED PNEUMONIA OF RIGHT MIDDLE LOBE OF LUNG: ICD-10-CM

## 2018-08-17 DIAGNOSIS — Z00.00 ROUTINE GENERAL MEDICAL EXAMINATION AT A HEALTH CARE FACILITY: ICD-10-CM

## 2018-08-17 LAB
BASOPHILS # BLD AUTO: 0.1 X10(3) UL (ref 0–0.1)
BASOPHILS NFR BLD AUTO: 1.3 %
CHOLEST SMN-MCNC: 159 MG/DL (ref ?–200)
COMPLEXED PSA SERPL-MCNC: 0.42 NG/ML (ref 0.01–4)
EOSINOPHIL # BLD AUTO: 0.28 X10(3) UL (ref 0–0.3)
EOSINOPHIL NFR BLD AUTO: 3.5 %
ERYTHROCYTE [DISTWIDTH] IN BLOOD BY AUTOMATED COUNT: 15.9 % (ref 11.5–16)
EST. AVERAGE GLUCOSE BLD GHB EST-MCNC: 134 MG/DL (ref 68–126)
HBA1C MFR BLD HPLC: 6.3 % (ref ?–5.7)
HCT VFR BLD AUTO: 42.4 % (ref 37–53)
HDLC SERPL-MCNC: 45 MG/DL (ref 40–59)
HGB BLD-MCNC: 13.3 G/DL (ref 13–17)
IMMATURE GRANULOCYTE COUNT: 0.02 X10(3) UL (ref 0–1)
IMMATURE GRANULOCYTE RATIO %: 0.3 %
LDLC SERPL CALC-MCNC: 96 MG/DL (ref ?–100)
LYMPHOCYTES # BLD AUTO: 1.89 X10(3) UL (ref 0.9–4)
LYMPHOCYTES NFR BLD AUTO: 23.7 %
MCH RBC QN AUTO: 26.6 PG (ref 27–33.2)
MCHC RBC AUTO-ENTMCNC: 31.4 G/DL (ref 31–37)
MCV RBC AUTO: 84.8 FL (ref 80–99)
MONOCYTES # BLD AUTO: 0.79 X10(3) UL (ref 0.1–1)
MONOCYTES NFR BLD AUTO: 9.9 %
NEUTROPHIL ABS PRELIM: 4.9 X10 (3) UL (ref 1.3–6.7)
NEUTROPHILS # BLD AUTO: 4.9 X10(3) UL (ref 1.3–6.7)
NEUTROPHILS NFR BLD AUTO: 61.3 %
NONHDLC SERPL-MCNC: 114 MG/DL (ref ?–130)
PLATELET # BLD AUTO: 399 10(3)UL (ref 150–450)
RBC # BLD AUTO: 5 X10(6)UL (ref 4.3–5.7)
RED CELL DISTRIBUTION WIDTH-SD: 48.6 FL (ref 35.1–46.3)
TRIGL SERPL-MCNC: 89 MG/DL (ref 30–149)
TSI SER-ACNC: 1.54 MIU/ML (ref 0.35–5.5)
VLDLC SERPL CALC-MCNC: 18 MG/DL (ref 0–30)
WBC # BLD AUTO: 8 X10(3) UL (ref 4–13)

## 2018-08-17 PROCEDURE — 36415 COLL VENOUS BLD VENIPUNCTURE: CPT

## 2018-08-17 PROCEDURE — 80061 LIPID PANEL: CPT

## 2018-08-17 PROCEDURE — 84443 ASSAY THYROID STIM HORMONE: CPT

## 2018-08-17 PROCEDURE — 83036 HEMOGLOBIN GLYCOSYLATED A1C: CPT

## 2018-08-17 PROCEDURE — 85025 COMPLETE CBC W/AUTO DIFF WBC: CPT

## 2018-08-17 PROCEDURE — 71046 X-RAY EXAM CHEST 2 VIEWS: CPT | Performed by: INTERNAL MEDICINE

## 2018-08-30 ENCOUNTER — PRIOR ORIGINAL RECORDS (OUTPATIENT)
Dept: OTHER | Age: 63
End: 2018-08-30

## 2018-08-31 LAB
CHOLESTEROL, TOTAL: 159 MG/DL
HDL CHOLESTEROL: 45 MG/DL
HEMATOCRIT: 42.4 %
HEMOGLOBIN A1C: 6.3 %
HEMOGLOBIN: 13.3 G/DL
LDL CHOLESTEROL: 96 MG/DL
PLATELETS: 399 K/UL
RED BLOOD COUNT: 5 X 10-6/U
THYROID STIMULATING HORMONE: 1.54 MLU/L
TRIGLYCERIDES: 89 MG/DL
WHITE BLOOD COUNT: 8 X 10-3/U

## 2018-09-10 ENCOUNTER — HOSPITAL ENCOUNTER (OUTPATIENT)
Dept: LAB | Facility: HOSPITAL | Age: 63
Discharge: HOME | End: 2018-09-10
Attending: INTERNAL MEDICINE
Payer: COMMERCIAL

## 2018-09-10 ENCOUNTER — PRIOR ORIGINAL RECORDS (OUTPATIENT)
Dept: OTHER | Age: 63
End: 2018-09-10

## 2018-09-10 LAB
INR BLD: 3.01 (ref 0.9–1.1)
INR: 3
PSA SERPL DL<=0.01 NG/ML-MCNC: 32.2 SECONDS (ref 12.4–14.7)

## 2018-09-10 PROCEDURE — 36415 COLL VENOUS BLD VENIPUNCTURE: CPT | Performed by: INTERNAL MEDICINE

## 2018-09-10 PROCEDURE — 85610 PROTHROMBIN TIME: CPT | Performed by: INTERNAL MEDICINE

## 2018-09-13 ENCOUNTER — HOSPITAL ENCOUNTER (OUTPATIENT)
Age: 63
Discharge: HOME | End: 2018-09-13
Attending: FAMILY MEDICINE
Payer: COMMERCIAL

## 2018-09-13 ENCOUNTER — APPOINTMENT (OUTPATIENT)
Dept: GENERAL RADIOLOGY | Age: 63
End: 2018-09-13
Attending: FAMILY MEDICINE
Payer: COMMERCIAL

## 2018-09-13 VITALS
SYSTOLIC BLOOD PRESSURE: 119 MMHG | OXYGEN SATURATION: 96 % | TEMPERATURE: 98 F | HEART RATE: 83 BPM | RESPIRATION RATE: 24 BRPM | DIASTOLIC BLOOD PRESSURE: 84 MMHG

## 2018-09-13 DIAGNOSIS — J18.9 PNEUMONIA OF LEFT UPPER LOBE DUE TO INFECTIOUS ORGANISM: Primary | ICD-10-CM

## 2018-09-13 DIAGNOSIS — Z87.19 HISTORY OF HIATAL HERNIA: ICD-10-CM

## 2018-09-13 PROCEDURE — 99214 OFFICE O/P EST MOD 30 MIN: CPT

## 2018-09-13 PROCEDURE — 94640 AIRWAY INHALATION TREATMENT: CPT

## 2018-09-13 PROCEDURE — 71046 X-RAY EXAM CHEST 2 VIEWS: CPT | Performed by: FAMILY MEDICINE

## 2018-09-13 RX ORDER — LEVOFLOXACIN 500 MG/1
500 TABLET, FILM COATED ORAL DAILY
Qty: 10 TABLET | Refills: 0 | Status: SHIPPED | OUTPATIENT
Start: 2018-09-13 | End: 2018-09-27

## 2018-09-13 RX ORDER — IPRATROPIUM BROMIDE AND ALBUTEROL SULFATE 2.5; .5 MG/3ML; MG/3ML
3 SOLUTION RESPIRATORY (INHALATION) ONCE
Status: COMPLETED | OUTPATIENT
Start: 2018-09-13 | End: 2018-09-13

## 2018-09-13 RX ORDER — ALBUTEROL SULFATE 90 UG/1
2 AEROSOL, METERED RESPIRATORY (INHALATION) EVERY 6 HOURS PRN
Qty: 1 INHALER | Refills: 0 | Status: SHIPPED | OUTPATIENT
Start: 2018-09-13 | End: 2018-10-13

## 2018-09-13 NOTE — ED PROVIDER NOTES
Patient Seen in: [facility]    History   Patient presents with:  Cough    Stated Complaint: cough and fever x 1 week    HPI  Is a 26-year-old male with multiple medical problems he has had a fever of 101 T-max a few days ago, hi replacement right laterality  No date: IR IVC FILTER PLACEMENT  8/2013: KNEE REPLACEMENT SURGERY      Comment:  Left  2/2014: KNEE REPLACEMENT SURGERY      Comment:  Right  No date: OTHER      Comment:  gastric sleeve  5/23/16: OTHER SURGICAL HISTORY NEURO: Alert and oriented to person place and time  GAIT: Normal          ED Course   Labs Reviewed - No data to display  He was given one breathing treatment while here in IC and felt improved. XR shows a new L upper lobe PNA at this time.  Considering h daily for 10 days. , Normal, Disp-10 tablet, R-0    Albuterol Sulfate  (90 Base) MCG/ACT Inhalation Aero Soln  Inhale 2 puffs into the lungs every 6 (six) hours as needed for Wheezing., Normal, Disp-1 Inhaler, R-0

## 2018-09-17 ENCOUNTER — PRIOR ORIGINAL RECORDS (OUTPATIENT)
Dept: OTHER | Age: 63
End: 2018-09-17

## 2018-09-17 ENCOUNTER — LAB ENCOUNTER (OUTPATIENT)
Dept: LAB | Age: 63
End: 2018-09-17
Attending: INTERNAL MEDICINE
Payer: COMMERCIAL

## 2018-09-17 ENCOUNTER — OFFICE VISIT (OUTPATIENT)
Dept: INTERNAL MEDICINE CLINIC | Facility: CLINIC | Age: 63
End: 2018-09-17

## 2018-09-17 VITALS
RESPIRATION RATE: 16 BRPM | HEART RATE: 80 BPM | OXYGEN SATURATION: 96 % | TEMPERATURE: 99 F | SYSTOLIC BLOOD PRESSURE: 130 MMHG | DIASTOLIC BLOOD PRESSURE: 90 MMHG | WEIGHT: 315 LBS | BODY MASS INDEX: 44.1 KG/M2 | HEIGHT: 71 IN

## 2018-09-17 DIAGNOSIS — J18.9 PNEUMONIA OF LEFT UPPER LOBE DUE TO INFECTIOUS ORGANISM: Primary | ICD-10-CM

## 2018-09-17 DIAGNOSIS — L60.9 NAIL DISORDER: ICD-10-CM

## 2018-09-17 DIAGNOSIS — T81.718A IATROGENIC PULMONARY EMBOLISM AND INFARCTION (HCC): ICD-10-CM

## 2018-09-17 DIAGNOSIS — I26.99 IATROGENIC PULMONARY EMBOLISM AND INFARCTION (HCC): ICD-10-CM

## 2018-09-17 DIAGNOSIS — I48.91 ATRIAL FIBRILLATION (HCC): Primary | ICD-10-CM

## 2018-09-17 DIAGNOSIS — Z23 NEED FOR PROPHYLACTIC VACCINATION AGAINST STREPTOCOCCUS PNEUMONIAE (PNEUMOCOCCUS): ICD-10-CM

## 2018-09-17 LAB
INR BLD: 5.05 (ref 0.9–1.1)
INR: 5
PSA SERPL DL<=0.01 NG/ML-MCNC: 48.2 SECONDS (ref 12.4–14.7)

## 2018-09-17 PROCEDURE — 90732 PPSV23 VACC 2 YRS+ SUBQ/IM: CPT | Performed by: INTERNAL MEDICINE

## 2018-09-17 PROCEDURE — 36415 COLL VENOUS BLD VENIPUNCTURE: CPT

## 2018-09-17 PROCEDURE — 85610 PROTHROMBIN TIME: CPT

## 2018-09-17 PROCEDURE — 90471 IMMUNIZATION ADMIN: CPT | Performed by: INTERNAL MEDICINE

## 2018-09-17 PROCEDURE — 99213 OFFICE O/P EST LOW 20 MIN: CPT | Performed by: INTERNAL MEDICINE

## 2018-09-17 NOTE — PROGRESS NOTES
Patient presents with:  Urgent Care F/u: follow up for pneumonia 9-13-18       HPI: Luciano Stevenson presents today for [facility] f/u dated 9/13/18 when he presented with a constellation of symptoms as written in the medical record that ultimately led to a CXR and a dx o week. M-W-F ), Disp: 30 tablet, Rfl: 11  •  omeprazole (PRILOSEC) 20 MG Oral Capsule Delayed Release, Take 20 mg by mouth 2 (two) times daily as needed.   , Disp: , Rfl:       Social History    Tobacco Use      Smoking status: Former Smoker        Packs/day encounter       Imaging & Consults:  PNEUMOCOCCAL IMM (PNEUMOVAX)  PODIATRY - INTERNAL  XR CHEST PA + LAT CHEST (CPT=71046)

## 2018-09-17 NOTE — PATIENT INSTRUCTIONS
Ashley Thomason,    1. First pneumonia vaccine called PNEUMOVAX-23 given today. You will get a repeat in 5 years. 2. Repeat chest X-ray in approximately 2-4 weeks to document resolution of the Pneumonia. 3. Complete the antibiotic.   4. See Podiatrist about silvia

## 2018-09-19 ENCOUNTER — APPOINTMENT (OUTPATIENT)
Dept: LAB | Age: 63
End: 2018-09-19
Attending: INTERNAL MEDICINE
Payer: COMMERCIAL

## 2018-09-19 ENCOUNTER — PRIOR ORIGINAL RECORDS (OUTPATIENT)
Dept: OTHER | Age: 63
End: 2018-09-19

## 2018-09-19 DIAGNOSIS — T81.718A IATROGENIC PULMONARY EMBOLISM AND INFARCTION (HCC): ICD-10-CM

## 2018-09-19 DIAGNOSIS — I48.91 ATRIAL FIBRILLATION (HCC): ICD-10-CM

## 2018-09-19 DIAGNOSIS — I26.99 IATROGENIC PULMONARY EMBOLISM AND INFARCTION (HCC): ICD-10-CM

## 2018-09-19 LAB
INR BLD: 2.6 (ref 0.9–1.1)
INR: 2.6
PSA SERPL DL<=0.01 NG/ML-MCNC: 28.7 SECONDS (ref 12.4–14.7)

## 2018-09-19 PROCEDURE — 36415 COLL VENOUS BLD VENIPUNCTURE: CPT

## 2018-09-19 PROCEDURE — 85610 PROTHROMBIN TIME: CPT

## 2018-09-26 ENCOUNTER — PRIOR ORIGINAL RECORDS (OUTPATIENT)
Dept: OTHER | Age: 63
End: 2018-09-26

## 2018-09-26 ENCOUNTER — APPOINTMENT (OUTPATIENT)
Dept: LAB | Age: 63
End: 2018-09-26
Attending: INTERNAL MEDICINE
Payer: COMMERCIAL

## 2018-09-26 ENCOUNTER — TELEPHONE (OUTPATIENT)
Dept: INTERNAL MEDICINE CLINIC | Facility: CLINIC | Age: 63
End: 2018-09-26

## 2018-09-26 ENCOUNTER — HOSPITAL ENCOUNTER (OUTPATIENT)
Dept: GENERAL RADIOLOGY | Age: 63
Discharge: HOME | End: 2018-09-26
Attending: INTERNAL MEDICINE
Payer: COMMERCIAL

## 2018-09-26 DIAGNOSIS — I26.99 IATROGENIC PULMONARY EMBOLISM AND INFARCTION (HCC): ICD-10-CM

## 2018-09-26 DIAGNOSIS — J18.9 PNEUMONIA OF LEFT UPPER LOBE DUE TO INFECTIOUS ORGANISM: ICD-10-CM

## 2018-09-26 DIAGNOSIS — T81.718A IATROGENIC PULMONARY EMBOLISM AND INFARCTION (HCC): ICD-10-CM

## 2018-09-26 DIAGNOSIS — J18.9 PNEUMONIA OF LEFT LUNG DUE TO INFECTIOUS ORGANISM, UNSPECIFIED PART OF LUNG: Primary | ICD-10-CM

## 2018-09-26 DIAGNOSIS — I48.91 ATRIAL FIBRILLATION (HCC): ICD-10-CM

## 2018-09-26 LAB
INR BLD: 2.36 (ref 0.9–1.1)
INR: 2.36
PSA SERPL DL<=0.01 NG/ML-MCNC: 26.6 SECONDS (ref 12.4–14.7)

## 2018-09-26 PROCEDURE — 71046 X-RAY EXAM CHEST 2 VIEWS: CPT | Performed by: INTERNAL MEDICINE

## 2018-09-26 PROCEDURE — 36415 COLL VENOUS BLD VENIPUNCTURE: CPT

## 2018-09-26 PROCEDURE — 85610 PROTHROMBIN TIME: CPT

## 2018-09-26 NOTE — TELEPHONE ENCOUNTER
Pt completed CXR today and waiting on results. States he completed antibiotics and still no improvement.

## 2018-09-27 ENCOUNTER — TELEPHONE (OUTPATIENT)
Dept: INTERNAL MEDICINE CLINIC | Facility: CLINIC | Age: 63
End: 2018-09-27

## 2018-09-27 RX ORDER — LEVOFLOXACIN 500 MG/1
500 TABLET, FILM COATED ORAL DAILY
Qty: 10 TABLET | Refills: 0 | Status: SHIPPED | OUTPATIENT
Start: 2018-09-27 | End: 2018-10-07

## 2018-09-27 NOTE — TELEPHONE ENCOUNTER
Please review x-ray:    \"CONCLUSION:  Persistent pneumonia left lung, as described above. Change in the pattern and distribution as above.   The previous x-ray was 13 days ago and the persistence of this at this time requires continued clinical and x-ray

## 2018-09-27 NOTE — TELEPHONE ENCOUNTER
Patient calling because he was waiting for his chest xray results from yesterday; please callback today he was told he'd be called back in am; pt is concerned

## 2018-09-27 NOTE — TELEPHONE ENCOUNTER
Stephy Hoyos routed this conversation to [facility] Clinical Staff   9/27/18 1:11 PM   Note      Patient calling because he was waiting for his chest xray results from yesterday; please callback today he was told he'd be called back in am; pt is concern

## 2018-09-28 ENCOUNTER — PRIOR ORIGINAL RECORDS (OUTPATIENT)
Dept: OTHER | Age: 63
End: 2018-09-28

## 2018-09-28 ENCOUNTER — TELEPHONE (OUTPATIENT)
Dept: INTERNAL MEDICINE CLINIC | Facility: CLINIC | Age: 63
End: 2018-09-28

## 2018-09-28 LAB — INR: 0

## 2018-09-28 NOTE — TELEPHONE ENCOUNTER
I spoke w/ patient regarding results. Franklin Bhatia. Naomi Landa MD  Diplomate, American Board of Internal Medicine  [address] [address] [address]  [facility],4Th Floor, [address], [facility], [address] [place] [address]  T: [phone]; F: [address]

## 2018-09-28 NOTE — TELEPHONE ENCOUNTER
Drug interaction of Levofloxacin with Warfarin- Levo increases effects on warfarin. Ok to dispense? Please advise.

## 2018-09-28 NOTE — TELEPHONE ENCOUNTER
Patient called regarding antibiotic that he was prescribed yesterday. Stated that he believes he was prescribed the incorrect medication.  Diagnosed with pneumonia and has been feeling no better

## 2018-10-02 ENCOUNTER — APPOINTMENT (OUTPATIENT)
Dept: LAB | Age: 63
End: 2018-10-02
Attending: INTERNAL MEDICINE
Payer: COMMERCIAL

## 2018-10-02 ENCOUNTER — OFFICE VISIT (OUTPATIENT)
Dept: PODIATRY CLINIC | Facility: CLINIC | Age: 63
End: 2018-10-02

## 2018-10-02 ENCOUNTER — PRIOR ORIGINAL RECORDS (OUTPATIENT)
Dept: OTHER | Age: 63
End: 2018-10-02

## 2018-10-02 DIAGNOSIS — L60.1 ONYCHOLYSIS OF TOENAIL: ICD-10-CM

## 2018-10-02 DIAGNOSIS — I48.91 ATRIAL FIBRILLATION (HCC): ICD-10-CM

## 2018-10-02 DIAGNOSIS — L60.9 NAIL DISORDER: ICD-10-CM

## 2018-10-02 DIAGNOSIS — I26.99 IATROGENIC PULMONARY EMBOLISM AND INFARCTION (HCC): ICD-10-CM

## 2018-10-02 DIAGNOSIS — B35.1 ONYCHOMYCOSIS: Primary | ICD-10-CM

## 2018-10-02 DIAGNOSIS — T81.718A IATROGENIC PULMONARY EMBOLISM AND INFARCTION (HCC): ICD-10-CM

## 2018-10-02 DIAGNOSIS — Z79.01 ANTICOAGULATED ON COUMADIN: ICD-10-CM

## 2018-10-02 LAB — INR: 3.9

## 2018-10-02 PROCEDURE — 11721 DEBRIDE NAIL 6 OR MORE: CPT | Performed by: PODIATRIST

## 2018-10-02 PROCEDURE — 99203 OFFICE O/P NEW LOW 30 MIN: CPT | Performed by: PODIATRIST

## 2018-10-02 PROCEDURE — 11755 BIOPSY NAIL UNIT: CPT | Performed by: PODIATRIST

## 2018-10-02 PROCEDURE — 36415 COLL VENOUS BLD VENIPUNCTURE: CPT

## 2018-10-02 PROCEDURE — 85610 PROTHROMBIN TIME: CPT

## 2018-10-02 NOTE — PROGRESS NOTES
Jil Loya is a 59year old male. Patient presents with: Toe Pain: reports left great toe nail has cracked approx 3 months ago pt is applying peroxide and bandaid.          HPI:   This pleasant gentleman presents to the office as a referral from his History of blood clots    • Obesity, unspecified    • Osteoarthrosis, unspecified whether generalized or localized, unspecified site    • Other and unspecified hyperlipidemia    • Pulmonary embolism [facility]) august 2014    multi-clots    • Unspecified essenti Not on file      Transportation needs - medical: Not on file      Transportation needs - non-medical: Not on file    Occupational History      Not on file    Tobacco Use      Smoking status: Former Smoker        Packs/day: 0.10        Types: Cigarettes pulses but they are weakened I could not palpate a posterior tibial because of the patient's peripheral edema which seems to be a well-organized lymphedema   3. Neurologic: Patient has intact sensorium he has pain sensation in   4.  Musculoskeletal: The pat

## 2018-10-09 ENCOUNTER — PRIOR ORIGINAL RECORDS (OUTPATIENT)
Dept: OTHER | Age: 63
End: 2018-10-09

## 2018-10-09 ENCOUNTER — APPOINTMENT (OUTPATIENT)
Dept: LAB | Age: 63
End: 2018-10-09
Attending: INTERNAL MEDICINE
Payer: COMMERCIAL

## 2018-10-09 DIAGNOSIS — T81.718A IATROGENIC PULMONARY EMBOLISM AND INFARCTION (HCC): ICD-10-CM

## 2018-10-09 DIAGNOSIS — I48.91 ATRIAL FIBRILLATION (HCC): ICD-10-CM

## 2018-10-09 DIAGNOSIS — I26.99 IATROGENIC PULMONARY EMBOLISM AND INFARCTION (HCC): ICD-10-CM

## 2018-10-09 LAB — INR: 3.1

## 2018-10-09 PROCEDURE — 85610 PROTHROMBIN TIME: CPT

## 2018-10-09 PROCEDURE — 36415 COLL VENOUS BLD VENIPUNCTURE: CPT

## 2018-10-11 ENCOUNTER — HOSPITAL ENCOUNTER (OUTPATIENT)
Dept: GENERAL RADIOLOGY | Age: 63
Discharge: HOME | End: 2018-10-11
Attending: INTERNAL MEDICINE
Payer: COMMERCIAL

## 2018-10-11 DIAGNOSIS — J18.9 PNEUMONIA OF LEFT LUNG DUE TO INFECTIOUS ORGANISM, UNSPECIFIED PART OF LUNG: ICD-10-CM

## 2018-10-11 PROCEDURE — 71046 X-RAY EXAM CHEST 2 VIEWS: CPT | Performed by: INTERNAL MEDICINE

## 2018-10-12 ENCOUNTER — TELEPHONE (OUTPATIENT)
Dept: INTERNAL MEDICINE CLINIC | Facility: CLINIC | Age: 63
End: 2018-10-12

## 2018-10-12 DIAGNOSIS — J18.9 PNEUMONIA, UNSPECIFIED ORGANISM: Primary | ICD-10-CM

## 2018-10-15 NOTE — TELEPHONE ENCOUNTER
Notes recorded by Catalina Covington MD on 10/11/2018 at 7:11 PM CDT  Radiologist would like another chest X-ray.  His image is significantly improved, however, when compared to previous test.  Please order CXR, diagnosis = pneumonia. Santos Franke him do this again in with your \". Apologized for pt's frustration again and wished pt to have a nice day.

## 2018-10-15 NOTE — TELEPHONE ENCOUNTER
I spoke with patient today- The nurse Salvador Ambrocio gave him his results today however he was very upset he did not receive a call on Friday.  I tried to explain to patient it takes 3-5 business days to receive test results and if the test was significantly abnorma

## 2018-10-17 VITALS — WEIGHT: 315 LBS | HEIGHT: 71 IN | BODY MASS INDEX: 44.1 KG/M2

## 2018-10-17 NOTE — HISTORICAL OFFICE NOTE
[facility]  : 1955  ACCOUNT:  [id number]  [phone]  PCP: Dr. Fidelia Harris     TODAY'S DATE: 2018  DICTATED BY:  [Dr. Rushing Due: [Followup of Other pulmonary embolism without acute cor pulmonale and Followup of Unspec hematochezia. : no hematuria. INTEG: no new rashes, lesions. MS: no limiting arthritis. NEURO: no localized deficits. HEM/LYMPH: denies easy bruising. ALL: no new food or enviornmental allergies.       PAST HISTORY: GERD, sleep apnea, morbid obesity, hip dehydration and pneumonia. ASSESSMENT:  1. Atrial flutter, unspecified  2. Coumadin Management, MHS  3. Coumadin management, Noncompliant  4. Hyperlipidemia, mixed  5. Hypertension  6. Obesity, morbid  7.  Other pulmonary embolism without acute cor pulmo

## 2018-10-19 ENCOUNTER — PRIOR ORIGINAL RECORDS (OUTPATIENT)
Dept: OTHER | Age: 63
End: 2018-10-19

## 2018-10-19 ENCOUNTER — HOSPITAL ENCOUNTER (OUTPATIENT)
Dept: INTERVENTIONAL RADIOLOGY/VASCULAR | Facility: HOSPITAL | Age: 63
Discharge: HOME | End: 2018-10-19
Attending: INTERNAL MEDICINE | Admitting: INTERNAL MEDICINE
Payer: COMMERCIAL

## 2018-10-19 DIAGNOSIS — I48.91 A-FIB (HCC): ICD-10-CM

## 2018-10-19 LAB — INR: 4.5

## 2018-10-19 PROCEDURE — 92960 CARDIOVERSION ELECTRIC EXT: CPT

## 2018-10-19 PROCEDURE — 85610 PROTHROMBIN TIME: CPT | Performed by: INTERNAL MEDICINE

## 2018-10-19 PROCEDURE — 5A2204Z RESTORATION OF CARDIAC RHYTHM, SINGLE: ICD-10-PCS | Performed by: INTERNAL MEDICINE

## 2018-10-19 RX ORDER — SODIUM CHLORIDE 9 MG/ML
INJECTION, SOLUTION INTRAVENOUS CONTINUOUS
Status: DISCONTINUED | OUTPATIENT
Start: 2018-10-19 | End: 2018-11-18

## 2018-10-19 RX ORDER — AMIODARONE HYDROCHLORIDE 200 MG/1
200 TABLET ORAL 3 TIMES DAILY
Qty: 90 TABLET | Refills: 0 | Status: ON HOLD | OUTPATIENT
Start: 2018-10-19 | End: 2018-12-20

## 2018-10-19 RX ADMIN — Medication 90 MG: at 09:41:00

## 2018-10-19 RX ADMIN — SODIUM CHLORIDE: 9 INJECTION, SOLUTION INTRAVENOUS at 08:15:00

## 2018-10-19 NOTE — PROGRESS NOTES
Discharge instructions reviewed. No questions. IV d.cd and pt discharged to home in stable condition.  Prescription called to [facility] and pt notified

## 2018-10-19 NOTE — PROCEDURES
PROCEDURE(S) PERFORMED:    1. Cardioversion. 2.     Sedation     :  Brad Hanks MD     ANESTHESIA:  IV sedation. INDICATION:  Persistent atrial fibrillation. COMPLICATIONS:  None.      METHODS:  The patient was brought to the outpatient

## 2018-10-19 NOTE — H&P
[facility]  H&P    Hira Elizabeth Patient Status:  Outpatient in a Bed    1955 MRN [id number]   Location [facility] Attending Wellington Garcia MD   Hosp Day # 0 PCP Ysabel Dodson MD REPLACEMENT SURGERY  02/01/2010    total hip replacement right laterality   • IR IVC FILTER PLACEMENT     • KNEE REPLACEMENT SURGERY  8/2013    Left   • KNEE REPLACEMENT SURGERY  2/2014    Right   • OTHER      gastric sleeve   • OTHER SURGICAL HISTORY  5/2 Laboratories and Data:  Diagnostics:  EKG: afib    Labs:     Lab Results   Component Value Date    INR 4.57 10/19/2018    PTP 44.6 10/19/2018     Lab Results   Component Value Date    PT 10.4 07/23/2013    PT 10.4 10/16/2012    INR 4.57 (H) 10/19/2018

## 2018-10-23 ENCOUNTER — PRIOR ORIGINAL RECORDS (OUTPATIENT)
Dept: OTHER | Age: 63
End: 2018-10-23

## 2018-10-24 ENCOUNTER — PRIOR ORIGINAL RECORDS (OUTPATIENT)
Dept: OTHER | Age: 63
End: 2018-10-24

## 2018-10-24 ENCOUNTER — TELEPHONE (OUTPATIENT)
Dept: INTERNAL MEDICINE CLINIC | Facility: CLINIC | Age: 63
End: 2018-10-24

## 2018-10-24 ENCOUNTER — HOSPITAL ENCOUNTER (OUTPATIENT)
Dept: GENERAL RADIOLOGY | Age: 63
Discharge: HOME | End: 2018-10-24
Attending: INTERNAL MEDICINE | Admitting: INTERNAL MEDICINE
Payer: COMMERCIAL

## 2018-10-24 ENCOUNTER — APPOINTMENT (OUTPATIENT)
Dept: LAB | Age: 63
End: 2018-10-24
Attending: INTERNAL MEDICINE
Payer: COMMERCIAL

## 2018-10-24 DIAGNOSIS — I26.99 IATROGENIC PULMONARY EMBOLISM AND INFARCTION (HCC): ICD-10-CM

## 2018-10-24 DIAGNOSIS — T81.718A IATROGENIC PULMONARY EMBOLISM AND INFARCTION (HCC): ICD-10-CM

## 2018-10-24 DIAGNOSIS — J18.9 PNEUMONIA, UNSPECIFIED ORGANISM: ICD-10-CM

## 2018-10-24 DIAGNOSIS — I48.91 ATRIAL FIBRILLATION (HCC): ICD-10-CM

## 2018-10-24 LAB — INR: 2.86

## 2018-10-24 PROCEDURE — 71046 X-RAY EXAM CHEST 2 VIEWS: CPT | Performed by: INTERNAL MEDICINE

## 2018-10-24 PROCEDURE — 36415 COLL VENOUS BLD VENIPUNCTURE: CPT

## 2018-10-24 PROCEDURE — 85610 PROTHROMBIN TIME: CPT

## 2018-10-24 NOTE — TELEPHONE ENCOUNTER
Pt requesting Dr Gwendlyn Jeans review CXR results from today. Cough improved and pt states he feels pretty good.

## 2018-10-25 NOTE — TELEPHONE ENCOUNTER
Vont[employer] message sent to patient. Andie Hill. Zuhair Stewart MD  Diplomate, American Board of Internal Medicine  [facility]  [address] [facility],4Th Floor, [address], [facility], [address] [place] [address]  T: [phone]; F: [address]

## 2018-10-29 ENCOUNTER — PRIOR ORIGINAL RECORDS (OUTPATIENT)
Dept: OTHER | Age: 63
End: 2018-10-29

## 2018-10-29 ENCOUNTER — APPOINTMENT (OUTPATIENT)
Dept: LAB | Age: 63
End: 2018-10-29
Attending: INTERNAL MEDICINE
Payer: COMMERCIAL

## 2018-10-29 DIAGNOSIS — I26.99 IATROGENIC PULMONARY EMBOLISM AND INFARCTION (HCC): ICD-10-CM

## 2018-10-29 DIAGNOSIS — I48.91 ATRIAL FIBRILLATION (HCC): ICD-10-CM

## 2018-10-29 DIAGNOSIS — T81.718A IATROGENIC PULMONARY EMBOLISM AND INFARCTION (HCC): ICD-10-CM

## 2018-10-29 LAB — INR: 2.62

## 2018-10-29 PROCEDURE — 36415 COLL VENOUS BLD VENIPUNCTURE: CPT

## 2018-10-29 PROCEDURE — 85610 PROTHROMBIN TIME: CPT

## 2018-11-06 ENCOUNTER — TELEPHONE (OUTPATIENT)
Dept: ORTHOPEDICS CLINIC | Facility: CLINIC | Age: 63
End: 2018-11-06

## 2018-11-09 ENCOUNTER — APPOINTMENT (OUTPATIENT)
Dept: LAB | Age: 63
End: 2018-11-09
Attending: INTERNAL MEDICINE
Payer: COMMERCIAL

## 2018-11-09 ENCOUNTER — PRIOR ORIGINAL RECORDS (OUTPATIENT)
Dept: OTHER | Age: 63
End: 2018-11-09

## 2018-11-09 DIAGNOSIS — T81.718A IATROGENIC PULMONARY EMBOLISM AND INFARCTION (HCC): ICD-10-CM

## 2018-11-09 DIAGNOSIS — I48.91 ATRIAL FIBRILLATION (HCC): ICD-10-CM

## 2018-11-09 DIAGNOSIS — I26.99 IATROGENIC PULMONARY EMBOLISM AND INFARCTION (HCC): ICD-10-CM

## 2018-11-09 LAB — INR: 2

## 2018-11-09 PROCEDURE — 85610 PROTHROMBIN TIME: CPT

## 2018-11-09 PROCEDURE — 36415 COLL VENOUS BLD VENIPUNCTURE: CPT

## 2018-11-14 ENCOUNTER — PRIOR ORIGINAL RECORDS (OUTPATIENT)
Dept: OTHER | Age: 63
End: 2018-11-14

## 2018-11-15 ENCOUNTER — OFFICE VISIT (OUTPATIENT)
Dept: PODIATRY CLINIC | Facility: CLINIC | Age: 63
End: 2018-11-15

## 2018-11-15 ENCOUNTER — APPOINTMENT (OUTPATIENT)
Dept: LAB | Age: 63
End: 2018-11-15
Attending: INTERNAL MEDICINE
Payer: COMMERCIAL

## 2018-11-15 DIAGNOSIS — L60.1 ONYCHOLYSIS OF TOENAIL: ICD-10-CM

## 2018-11-15 DIAGNOSIS — Z79.01 ANTICOAGULATED ON COUMADIN: ICD-10-CM

## 2018-11-15 DIAGNOSIS — I26.99 IATROGENIC PULMONARY EMBOLISM AND INFARCTION (HCC): ICD-10-CM

## 2018-11-15 DIAGNOSIS — B35.1 ONYCHOMYCOSIS: Primary | ICD-10-CM

## 2018-11-15 DIAGNOSIS — T81.718A IATROGENIC PULMONARY EMBOLISM AND INFARCTION (HCC): ICD-10-CM

## 2018-11-15 DIAGNOSIS — I48.91 ATRIAL FIBRILLATION (HCC): ICD-10-CM

## 2018-11-15 DIAGNOSIS — L60.9 NAIL DISORDER: ICD-10-CM

## 2018-11-15 LAB — INR: 1.8

## 2018-11-15 PROCEDURE — 36415 COLL VENOUS BLD VENIPUNCTURE: CPT

## 2018-11-15 PROCEDURE — 99213 OFFICE O/P EST LOW 20 MIN: CPT | Performed by: PODIATRIST

## 2018-11-15 PROCEDURE — 85610 PROTHROMBIN TIME: CPT

## 2018-11-15 NOTE — PROGRESS NOTES
Cummings Joseph is a 59year old male. Patient presents with:  Toenail Fungus: Pt here for culture results. HPI:   Pleasant gentleman presents to the clinic with his wife present. He is here to review his fungal nail results.   Allergies: Patient Relation Age of Onset   • Hypertension Mother    • Arthritis Mother    • Diabetes Father    • Hypertension Father    • Heart Disorder Brother         Cardiac arrhythmia      Social History    Socioeconomic History      Marital status:       Spouse n taken for this visit.   Physical Exam  GENERAL: well developed, well nourished, in no apparent distress  EXTREMITIES:     Fungus HSN Culture Result Aspergillus fumigatus Abnormal           Resulting Agency: [facility]   Patient's toenails on both feet are

## 2018-11-16 ENCOUNTER — MYAURORA ACCOUNT LINK (OUTPATIENT)
Dept: OTHER | Age: 63
End: 2018-11-16

## 2018-11-16 ENCOUNTER — PRIOR ORIGINAL RECORDS (OUTPATIENT)
Dept: OTHER | Age: 63
End: 2018-11-16

## 2018-11-19 ENCOUNTER — TELEPHONE (OUTPATIENT)
Dept: INTERNAL MEDICINE CLINIC | Facility: CLINIC | Age: 63
End: 2018-11-19

## 2018-11-19 RX ORDER — TERBINAFINE HYDROCHLORIDE 250 MG/1
250 TABLET ORAL DAILY
Qty: 90 TABLET | Refills: 0 | Status: SHIPPED | OUTPATIENT
Start: 2018-11-19 | End: 2019-01-01 | Stop reason: ALTCHOICE

## 2018-11-19 NOTE — TELEPHONE ENCOUNTER
Call patient. I spoke w/ Dr. Jluis Bruno. He's recommending at least 3 months of prescription medication, Terbinafine, for the treatment of his toenail fungus. I've sent in a prescription to his pharmacy.   He should follow up with Dr. Jluis Bruno as scheduled w

## 2018-11-20 ENCOUNTER — HOSPITAL ENCOUNTER (OUTPATIENT)
Dept: CV DIAGNOSTICS | Facility: HOSPITAL | Age: 63
Discharge: HOME | End: 2018-11-20
Attending: INTERNAL MEDICINE
Payer: COMMERCIAL

## 2018-11-20 ENCOUNTER — TELEPHONE (OUTPATIENT)
Dept: INTERNAL MEDICINE CLINIC | Facility: CLINIC | Age: 63
End: 2018-11-20

## 2018-11-20 DIAGNOSIS — I48.91 ATRIAL FIBRILLATION (HCC): ICD-10-CM

## 2018-11-20 PROCEDURE — 78452 HT MUSCLE IMAGE SPECT MULT: CPT | Performed by: INTERNAL MEDICINE

## 2018-11-20 PROCEDURE — 93017 CV STRESS TEST TRACING ONLY: CPT | Performed by: INTERNAL MEDICINE

## 2018-11-20 PROCEDURE — 93018 CV STRESS TEST I&R ONLY: CPT | Performed by: INTERNAL MEDICINE

## 2018-11-20 NOTE — TELEPHONE ENCOUNTER
Noted.  OK to proceed. Patient is monitored in the Coumadin Clinic. Morgan Mitchell. Jane Wellington MD  Diplomate, American Board of Internal Medicine  [facility]  [address] [facility],4Th Floor, [address], [facility], [address] [place] [address]  T: [phone]; F: [address]

## 2018-11-20 NOTE — TELEPHONE ENCOUNTER
[employer] called to report drug interaction between Terbinafine and Warfarin. Ok to dispense Terbinafine?

## 2018-11-21 ENCOUNTER — PRIOR ORIGINAL RECORDS (OUTPATIENT)
Dept: OTHER | Age: 63
End: 2018-11-21

## 2018-11-21 ENCOUNTER — APPOINTMENT (OUTPATIENT)
Dept: LAB | Age: 63
End: 2018-11-21
Attending: INTERNAL MEDICINE
Payer: COMMERCIAL

## 2018-11-21 DIAGNOSIS — I26.99 IATROGENIC PULMONARY EMBOLISM AND INFARCTION (HCC): ICD-10-CM

## 2018-11-21 DIAGNOSIS — I48.91 ATRIAL FIBRILLATION (HCC): ICD-10-CM

## 2018-11-21 DIAGNOSIS — T81.718A IATROGENIC PULMONARY EMBOLISM AND INFARCTION (HCC): ICD-10-CM

## 2018-11-21 LAB — INR: 2.4

## 2018-11-21 PROCEDURE — 85610 PROTHROMBIN TIME: CPT

## 2018-11-21 PROCEDURE — 36415 COLL VENOUS BLD VENIPUNCTURE: CPT

## 2018-11-21 NOTE — TELEPHONE ENCOUNTER
Pharmacy informed. Pt was also contacted and instructed to notify the Coumadin Clinic he will be starting on Terbinafine. Pt verbalizes understanding.

## 2018-11-26 ENCOUNTER — PRIOR ORIGINAL RECORDS (OUTPATIENT)
Dept: OTHER | Age: 63
End: 2018-11-26

## 2018-12-04 ENCOUNTER — PRIOR ORIGINAL RECORDS (OUTPATIENT)
Dept: OTHER | Age: 63
End: 2018-12-04

## 2018-12-04 ENCOUNTER — APPOINTMENT (OUTPATIENT)
Dept: LAB | Age: 63
End: 2018-12-04
Attending: INTERNAL MEDICINE
Payer: COMMERCIAL

## 2018-12-04 DIAGNOSIS — I48.91 ATRIAL FIBRILLATION (HCC): ICD-10-CM

## 2018-12-04 DIAGNOSIS — I26.99 IATROGENIC PULMONARY EMBOLISM AND INFARCTION (HCC): ICD-10-CM

## 2018-12-04 DIAGNOSIS — T81.718A IATROGENIC PULMONARY EMBOLISM AND INFARCTION (HCC): ICD-10-CM

## 2018-12-04 LAB — INR: 2.05

## 2018-12-04 PROCEDURE — 36415 COLL VENOUS BLD VENIPUNCTURE: CPT

## 2018-12-04 PROCEDURE — 85610 PROTHROMBIN TIME: CPT

## 2018-12-07 ENCOUNTER — HOSPITAL ENCOUNTER (OUTPATIENT)
Dept: CV DIAGNOSTICS | Facility: HOSPITAL | Age: 63
Discharge: HOME | End: 2018-12-07
Attending: INTERNAL MEDICINE

## 2018-12-07 ENCOUNTER — MYAURORA ACCOUNT LINK (OUTPATIENT)
Dept: OTHER | Age: 63
End: 2018-12-07

## 2018-12-07 DIAGNOSIS — I48.92 ATRIAL FLUTTER, UNSPECIFIED TYPE (HCC): ICD-10-CM

## 2018-12-07 DIAGNOSIS — I48.91 ATRIAL FIBRILLATION, UNSPECIFIED TYPE (HCC): ICD-10-CM

## 2018-12-13 ENCOUNTER — APPOINTMENT (OUTPATIENT)
Dept: GENERAL RADIOLOGY | Age: 63
End: 2018-12-13
Attending: EMERGENCY MEDICINE
Payer: COMMERCIAL

## 2018-12-13 ENCOUNTER — HOSPITAL ENCOUNTER (INPATIENT)
Facility: HOSPITAL | Age: 63
LOS: 7 days | Discharge: HOME | DRG: 287 | End: 2018-12-20
Attending: EMERGENCY MEDICINE | Admitting: HOSPITALIST
Payer: COMMERCIAL

## 2018-12-13 ENCOUNTER — APPOINTMENT (OUTPATIENT)
Dept: NUCLEAR MEDICINE | Facility: HOSPITAL | Age: 63
DRG: 287 | End: 2018-12-13
Attending: EMERGENCY MEDICINE
Payer: COMMERCIAL

## 2018-12-13 ENCOUNTER — APPOINTMENT (OUTPATIENT)
Dept: CT IMAGING | Facility: HOSPITAL | Age: 63
DRG: 287 | End: 2018-12-13
Attending: EMERGENCY MEDICINE
Payer: COMMERCIAL

## 2018-12-13 ENCOUNTER — HOSPITAL ENCOUNTER (OUTPATIENT)
Age: 63
Discharge: ANOTHER HOSPITAL | End: 2018-12-13
Attending: EMERGENCY MEDICINE
Payer: COMMERCIAL

## 2018-12-13 VITALS
TEMPERATURE: 98 F | HEART RATE: 90 BPM | WEIGHT: 315 LBS | RESPIRATION RATE: 32 BRPM | HEIGHT: 71 IN | SYSTOLIC BLOOD PRESSURE: 149 MMHG | BODY MASS INDEX: 44.1 KG/M2 | DIASTOLIC BLOOD PRESSURE: 81 MMHG | OXYGEN SATURATION: 93 %

## 2018-12-13 DIAGNOSIS — J18.9 COMMUNITY ACQUIRED PNEUMONIA, UNSPECIFIED LATERALITY: ICD-10-CM

## 2018-12-13 DIAGNOSIS — I48.20 CHRONIC A-FIB (HCC): ICD-10-CM

## 2018-12-13 DIAGNOSIS — R06.00 DOE (DYSPNEA ON EXERTION): Primary | ICD-10-CM

## 2018-12-13 DIAGNOSIS — R09.02 HYPOXIA: ICD-10-CM

## 2018-12-13 DIAGNOSIS — R06.00 DYSPNEA, UNSPECIFIED TYPE: Primary | ICD-10-CM

## 2018-12-13 PROCEDURE — 85610 PROTHROMBIN TIME: CPT | Performed by: EMERGENCY MEDICINE

## 2018-12-13 PROCEDURE — 71046 X-RAY EXAM CHEST 2 VIEWS: CPT | Performed by: EMERGENCY MEDICINE

## 2018-12-13 PROCEDURE — 99215 OFFICE O/P EST HI 40 MIN: CPT

## 2018-12-13 PROCEDURE — 80047 BASIC METABLC PNL IONIZED CA: CPT

## 2018-12-13 PROCEDURE — 71275 CT ANGIOGRAPHY CHEST: CPT | Performed by: EMERGENCY MEDICINE

## 2018-12-13 PROCEDURE — 36415 COLL VENOUS BLD VENIPUNCTURE: CPT

## 2018-12-13 PROCEDURE — 82962 GLUCOSE BLOOD TEST: CPT

## 2018-12-13 PROCEDURE — 78582 LUNG VENTILAT&PERFUS IMAGING: CPT | Performed by: EMERGENCY MEDICINE

## 2018-12-13 PROCEDURE — 82272 OCCULT BLD FECES 1-3 TESTS: CPT | Performed by: EMERGENCY MEDICINE

## 2018-12-13 PROCEDURE — 85025 COMPLETE CBC W/AUTO DIFF WBC: CPT | Performed by: EMERGENCY MEDICINE

## 2018-12-13 PROCEDURE — 93010 ELECTROCARDIOGRAM REPORT: CPT

## 2018-12-13 PROCEDURE — 99223 1ST HOSP IP/OBS HIGH 75: CPT | Performed by: HOSPITALIST

## 2018-12-13 PROCEDURE — 85378 FIBRIN DEGRADE SEMIQUANT: CPT | Performed by: EMERGENCY MEDICINE

## 2018-12-13 PROCEDURE — 84484 ASSAY OF TROPONIN QUANT: CPT

## 2018-12-13 PROCEDURE — 93005 ELECTROCARDIOGRAM TRACING: CPT

## 2018-12-13 RX ORDER — ACETAMINOPHEN 325 MG/1
650 TABLET ORAL EVERY 6 HOURS PRN
Status: DISCONTINUED | OUTPATIENT
Start: 2018-12-13 | End: 2018-12-20

## 2018-12-13 RX ORDER — WARFARIN SODIUM 5 MG/1
5 TABLET ORAL
Status: DISCONTINUED | OUTPATIENT
Start: 2018-12-14 | End: 2018-12-13 | Stop reason: SDUPTHER

## 2018-12-13 RX ORDER — PANTOPRAZOLE SODIUM 20 MG/1
20 TABLET, DELAYED RELEASE ORAL
Status: DISCONTINUED | OUTPATIENT
Start: 2018-12-14 | End: 2018-12-20

## 2018-12-13 RX ORDER — ACETAMINOPHEN 500 MG
1000 TABLET ORAL ONCE
Status: COMPLETED | OUTPATIENT
Start: 2018-12-13 | End: 2018-12-13

## 2018-12-13 RX ORDER — LISINOPRIL 40 MG/1
40 TABLET ORAL DAILY
Status: DISCONTINUED | OUTPATIENT
Start: 2018-12-13 | End: 2018-12-20

## 2018-12-13 RX ORDER — METOPROLOL TARTRATE 100 MG/1
100 TABLET ORAL 2 TIMES DAILY
Status: DISCONTINUED | OUTPATIENT
Start: 2018-12-13 | End: 2018-12-14

## 2018-12-13 RX ORDER — TERBINAFINE HYDROCHLORIDE 250 MG/1
250 TABLET ORAL DAILY
Status: DISCONTINUED | OUTPATIENT
Start: 2018-12-13 | End: 2018-12-14

## 2018-12-13 RX ORDER — AMIODARONE HYDROCHLORIDE 200 MG/1
200 TABLET ORAL 3 TIMES DAILY
Status: DISCONTINUED | OUTPATIENT
Start: 2018-12-13 | End: 2018-12-14

## 2018-12-13 RX ORDER — WARFARIN SODIUM 2.5 MG/1
2.5 TABLET ORAL
Status: DISCONTINUED | OUTPATIENT
Start: 2018-12-13 | End: 2018-12-15

## 2018-12-13 RX ORDER — METOCLOPRAMIDE HYDROCHLORIDE 5 MG/ML
10 INJECTION INTRAMUSCULAR; INTRAVENOUS EVERY 8 HOURS PRN
Status: DISCONTINUED | OUTPATIENT
Start: 2018-12-13 | End: 2018-12-20

## 2018-12-13 RX ORDER — AMLODIPINE BESYLATE 5 MG/1
10 TABLET ORAL DAILY
Status: DISCONTINUED | OUTPATIENT
Start: 2018-12-13 | End: 2018-12-14

## 2018-12-13 RX ORDER — WARFARIN SODIUM 2.5 MG/1
2.5 TABLET ORAL NIGHTLY
Status: DISCONTINUED | OUTPATIENT
Start: 2018-12-13 | End: 2018-12-13 | Stop reason: SDUPTHER

## 2018-12-13 RX ORDER — ONDANSETRON 2 MG/ML
4 INJECTION INTRAMUSCULAR; INTRAVENOUS EVERY 6 HOURS PRN
Status: DISCONTINUED | OUTPATIENT
Start: 2018-12-13 | End: 2018-12-20

## 2018-12-13 RX ORDER — WARFARIN SODIUM 5 MG/1
5 TABLET ORAL
Status: DISCONTINUED | OUTPATIENT
Start: 2018-12-16 | End: 2018-12-15

## 2018-12-13 NOTE — ED PROVIDER NOTES
Patient Seen in: [facility] Emergency Department    History   Patient presents with:  Dyspnea THADDEUS SOB (respiratory)    Stated Complaint: SOB, chest pressure, elevated D-dimer. increased sob over last couple days.       HPI    This is a 59-year-old was diagnosed in 2014 after a trip to [facility].  He has been on continue anticoagulation secondary to his A. fib. Currently, at rest he denies any physical complaints. He does not smoke tobacco.  He drinks about 12 ounces of hard alcohol weekly.   He presen since quittin.7      Smokeless tobacco: Never Used      Tobacco comment: Smoked x3 months only    Alcohol use:  Yes      Alcohol/week: 1.5 oz      Types: 3 Shots of liquor per week    Drug use: No      Review of Systems    Positive for stated complaint: All other components within normal limits   VENOUS BLOOD GAS - Abnormal; Notable for the following components:    Venous pH 7.44 (*)     Venous pCO2 36 (*)     Venous O2 Saturation 83 (*)     Venous O2 Sat.  Calc. 86 (*)     All other components within n (CPT=71275)(), 6/14/2016, 20:26. INDICATIONS:  SOB, chest pressure, elevated D-dimer. increased sob over last couple days.    20G to Rt ac in place, seen by Dr Latoya Monzon at [address]:  IV contrast-enhanced multislice CT angiography is performed thr reactive in nature. 4.  Small bilateral pleural effusions. 5.  Hepatic steatosis with perihepatic ascites.  6.  Cardiomegaly    Dictated by: Wilber Ramos MD on 12/13/2018 at 16:00     Approved by: Wilber Ramos MD                [facility]     Patient place Reviewed: 12/13/2018          ICD-10-CM Noted POA    * (Principal) OLIVEIRA (dyspnea on exertion) R06.09 12/13/2018 Yes    Anticoagulated on Coumadin Z79.01 8/22/2014 Yes    Chronic a-fib ([facility] [address] I48.2 12/13/2018     Community acquired pneumonia, unspecified late

## 2018-12-13 NOTE — ED INITIAL ASSESSMENT (HPI)
Pt here c/o sob for last week. States sob is worse over the last couple days. Had pneumonia twice over the last 6 months. Also c/o heaviness to chest.   Denies nausea, denies vomiting.

## 2018-12-13 NOTE — ED NOTES
Pt left via wc to be transferred to THE [facility] ER. Pt signed AMA form for ambulance transport and had family member drive him. Saline lock in place, and wrapped. Pt in stable condition at this time.

## 2018-12-13 NOTE — H&P
[facility]  History and Physical     Chong Hamm Patient Status:  Emergency    1955 MRN [id number]   Location [facility] [facility] Attending Silvia Mijares, [facility] Day # 0 PCP Jenelle Sanchez MD     Chief Complaint: SO Clay's    • Glucose intolerance (pre-diabetes)    • High blood pressure    • High cholesterol    • History of blood clots    • Obesity, unspecified    • Osteoarthrosis, unspecified whether generalized or localized, unspecified site    • Other and unspec Prior to Encounter:  Terbinafine HCl 250 MG Oral Tab Take 1 tablet (250 mg total) by mouth daily. Disp: 90 tablet Rfl: 0   amiodarone HCl 200 MG Oral Tab Take 1 tablet (200 mg total) by mouth 3 (three) times daily.  Disp: 90 tablet Rfl: 0   Sildenafil [facility] edema with venous stasis changes  Integument: No rashes or lesions. Psychiatric: Appropriate mood and affect.       Diagnostic Data:      Labs:  Recent Labs   Lab  12/13/18   1139  12/13/18   1148  12/13/18   1325   MCV  85.6   --    --    INR   --   2.26

## 2018-12-13 NOTE — ED NOTES
After lying patient down, pulse ox dropped to 89 percent, pt placed on O2 via NC at 2L and increased to 93%. Dr. Manuel Sosa notified.

## 2018-12-13 NOTE — ED NOTES
Attempted IV unsuccessfully to right hand. Ceftriaxone sent from pharmacy is 1 gm when dose is 2 gm. Pharmacy notified. 1 gm returned. States will send 2 gm of ceftriaxone.

## 2018-12-13 NOTE — ED PROVIDER NOTES
Patient Seen in: [facility] [place]    History   Patient presents with:  Dyspnea THADDEUS SOB (respiratory)    Stated Complaint: short of breath dizziness    HPI    Patient is a 56-year-old male with multiple medical problems including atria SURGICAL HISTORY  5/23/16    Lap Sleeve Gastrectomy by Dr. Sho Mcgarry @ [address] [place] [address]  3/2015    erosive esophagitis with erosions/ulcers immediately above GEJ (biopsies for intestinal metaplasia taken w/out barretts)   • HGB IC 11.4 (*)     MCH IC 25.7 (*)     MCHC IC 30.1 (*)     # Mixed Cells 1.3 (*)     All other components within normal limits   D-DIMER (POC) - Abnormal; Notable for the following components:    D-Dimer  (*)     All other components within normal embolus  Disposition: Ic to ed  12/13/2018 12:02 pm    Follow-up:  No follow-up provider specified.       Medications Prescribed:  Current Discharge Medication List

## 2018-12-14 PROCEDURE — 99232 SBSQ HOSP IP/OBS MODERATE 35: CPT | Performed by: HOSPITALIST

## 2018-12-14 RX ORDER — FUROSEMIDE 10 MG/ML
40 INJECTION INTRAMUSCULAR; INTRAVENOUS
Status: DISCONTINUED | OUTPATIENT
Start: 2018-12-14 | End: 2018-12-14

## 2018-12-14 RX ORDER — LISINOPRIL 40 MG/1
40 TABLET ORAL DAILY
Status: ON HOLD | COMMUNITY
End: 2018-12-20

## 2018-12-14 RX ORDER — POTASSIUM CHLORIDE 20 MEQ/1
40 TABLET, EXTENDED RELEASE ORAL EVERY 4 HOURS
Status: COMPLETED | OUTPATIENT
Start: 2018-12-14 | End: 2018-12-14

## 2018-12-14 RX ORDER — METOPROLOL TARTRATE 100 MG/1
100 TABLET ORAL 2 TIMES DAILY
Status: ON HOLD | COMMUNITY
End: 2018-12-20

## 2018-12-14 RX ORDER — WARFARIN SODIUM 2.5 MG/1
2.5 TABLET ORAL SEE ADMIN INSTRUCTIONS
COMMUNITY

## 2018-12-14 RX ORDER — BUMETANIDE 0.25 MG/ML
4 INJECTION, SOLUTION INTRAMUSCULAR; INTRAVENOUS ONCE
Status: COMPLETED | OUTPATIENT
Start: 2018-12-14 | End: 2018-12-14

## 2018-12-14 RX ORDER — METOPROLOL SUCCINATE 100 MG/1
200 TABLET, EXTENDED RELEASE ORAL NIGHTLY
Status: DISCONTINUED | OUTPATIENT
Start: 2018-12-14 | End: 2018-12-20

## 2018-12-14 RX ORDER — EPLERENONE 25 MG/1
25 TABLET, FILM COATED ORAL DAILY
Status: DISCONTINUED | OUTPATIENT
Start: 2018-12-14 | End: 2018-12-18

## 2018-12-14 RX ORDER — AMIODARONE HYDROCHLORIDE 200 MG/1
200 TABLET ORAL DAILY
Status: DISCONTINUED | OUTPATIENT
Start: 2018-12-15 | End: 2018-12-20

## 2018-12-14 RX ORDER — WARFARIN SODIUM 5 MG/1
5 TABLET ORAL SEE ADMIN INSTRUCTIONS
COMMUNITY

## 2018-12-14 NOTE — CONSULTS
Pulmonary / Critical Care H&P/Consult       NAME: Hiram Jackson - ROOM: [facility]4694-Z - MRN: [id number] - Age: 59year old - :  1955    Date of Admission: 2018  1:10 PM  Admission Diagnosis: OLIVEIRA (dyspnea on exertion) [R06.09]  Hypoxia [R09.02 Lap Sleeve Gastrectomy by Dr. Cherry Perez @ [address] [place] [address]  3/2015    erosive esophagitis with erosions/ulcers immediately above GEJ (biopsies for intestinal metaplasia taken w/out barretts)   • UPPER GI ENDOSCOPY,EXAM Disorder Brother         Cardiac arrhythmia        Home Medications:    Outpatient Medications Marked as Taking for the 12/13/18 encounter [facility] Encounter):  amiodarone HCl 200 MG Oral Tab Take 1 tablet (200 mg total) by mouth 3 (three) times daily.  (P 133/78 132/71 (!) 128/103   BP Location: Left arm Right arm Right arm Right arm   Pulse: 105 80 94 92   Resp: 20 23 20 22   Temp: 98.8 °F (37.1 °C) 98.5 °F (36.9 °C) 97.8 °F (36.6 °C) 97.9 °F (36.6 °C)   TempSrc: Oral Oral Oral Oral   SpO2: 97% 97%  94% (mg/dL)   Date Value   05/07/2015 0.83   10/16/2013 0.82   07/23/2013 0.77     Creatinine (mg/dL)   Date Value   12/14/2018 0.79   07/30/2018 0.77   07/18/2018 0.92   ]    No results for input(s): ALKPHOS, ALT, AST in the last 168 hours.     Invalid input(s

## 2018-12-14 NOTE — ED NOTES
Temp rechecked at patient's request.  Oral temp noted to be 100.3. Dr. Jaonie Escudero notified, orders receivedl.

## 2018-12-14 NOTE — ED NOTES
To VQ via cart. To go to floor after study. Patient's wife at bedside, aware and has patient belongings.

## 2018-12-14 NOTE — CM/SW NOTE
12/14/18 1400   CM/SW Screening   Referral Source Nurse   Information Source Chart review;Nursing rounds   Patient's Mental Status Alert;Oriented   SW received HHC order per PNA/CHF protocol.  Patient was screened during rounds and no needs are identifie

## 2018-12-14 NOTE — ED NOTES
Report called to Ksenia Kim RN. Pt awaiting VQ. Dr. Souza Reveal states patient can go to floor from VQ and if there are any abnormalities, she will order meds. Ksenia Kim aware.

## 2018-12-14 NOTE — PROGRESS NOTES
[facility]  Progress Note     Rodolfo Zambrano Patient Status:  Inpatient    1955 MRN [id number]   [facility] 2NE-A Attending Layo Rose MD   Hosp Day # 1 PCP Fidelia Harris MD     Chief Complaint: Dyspnea    S: Patient feels mg Oral BID   • Pantoprazole Sodium  20 mg Oral QAM AC   • Terbinafine HCl  250 mg Oral Daily   • Warfarin Sodium  2.5 mg Oral Once per day on Mon Tue Wed Thu Fri Sat   • [START ON 12/16/2018] Warfarin Sodium  5 mg Oral Once per day on Sun       ASSESSMENT

## 2018-12-14 NOTE — PLAN OF CARE
Pt admitted for SOB/OLIVEIRA into 2617 as inpatient. Went into immediate care, CXR showed bilateral infiltrates worrisome for multifocal pma. 89% on RA, 93% on 2 L. D dimer elevated.  V/q scan (-) lactic (-) flu(-) procalcitonin (-) pBNP 1,193    Rocephin 2 G

## 2018-12-14 NOTE — CONSULTS
[facility]  Advanced Heart Failure Consultation    Rianna Wilson Patient Status:  Inpatient    1955 MRN [id number]   [facility] 2NE-A Attending Eze Fletcher MD   Hosp Day # 1 PCP Marsha Dobson MD     Reason for Consultation:  F REPLACEMENT SURGERY  8/2013    Left   • KNEE REPLACEMENT SURGERY  2/2014    Right   • OTHER      gastric sleeve   • OTHER SURGICAL HISTORY  5/23/16    Lap Sleeve Gastrectomy by Dr. Kane Rae @ [address] [place] [address]  3/2015    er reviewed and are negative except as described above in HPI. Physical Exam:  Blood pressure 141/68, pulse 82, temperature 98.1 °F (36.7 °C), temperature source Oral, resp.  rate 20, height 180.3 cm (5' 11\"), weight (!) 399 lb 14.4 oz (181.4 kg), SpO2 95 % Date Value   12/14/2018 0.79   07/30/2018 0.77   07/18/2018 0.92       Assessment/Plan:  Patient Active Problem List:     Erectile Dysfunction     Essential hypertension     DJD (degenerative joint disease)     Anticoagulated on Coumadin     ALBA on CPAP

## 2018-12-15 PROBLEM — I50.9 HEART FAILURE (HCC): Status: ACTIVE | Noted: 2018-12-15

## 2018-12-15 PROCEDURE — 99232 SBSQ HOSP IP/OBS MODERATE 35: CPT | Performed by: HOSPITALIST

## 2018-12-15 RX ORDER — POTASSIUM CHLORIDE 20 MEQ/1
40 TABLET, EXTENDED RELEASE ORAL EVERY 4 HOURS
Status: COMPLETED | OUTPATIENT
Start: 2018-12-15 | End: 2018-12-15

## 2018-12-15 RX ORDER — HEPARIN SODIUM 5000 [USP'U]/ML
5000 INJECTION INTRAVENOUS; SUBCUTANEOUS ONCE
Status: COMPLETED | OUTPATIENT
Start: 2018-12-15 | End: 2018-12-15

## 2018-12-15 RX ORDER — HEPARIN SODIUM AND DEXTROSE 10000; 5 [USP'U]/100ML; G/100ML
1000 INJECTION INTRAVENOUS ONCE
Status: COMPLETED | OUTPATIENT
Start: 2018-12-15 | End: 2018-12-15

## 2018-12-15 RX ORDER — HEPARIN SODIUM AND DEXTROSE 10000; 5 [USP'U]/100ML; G/100ML
INJECTION INTRAVENOUS CONTINUOUS
Status: DISCONTINUED | OUTPATIENT
Start: 2018-12-15 | End: 2018-12-20

## 2018-12-15 RX ORDER — POTASSIUM CHLORIDE 20 MEQ/1
40 TABLET, EXTENDED RELEASE ORAL ONCE
Status: DISCONTINUED | OUTPATIENT
Start: 2018-12-15 | End: 2018-12-15

## 2018-12-15 NOTE — RESPIRATORY THERAPY NOTE
Patient seen for ALBA eval, states he has a machine at home but is non complaint. Advised patient he should start wearing his cpap however patient declined and stated he would look into it more when he was discharged.  Currently patient on 3L NC, calixto wel

## 2018-12-15 NOTE — PLAN OF CARE
CARDIOVASCULAR - ADULT    • Maintains optimal cardiac output and hemodynamic stability Progressing    • Absence of cardiac arrhythmias or at baseline Progressing           HF protocol initiated- no diagnosis of CHF in charting from MD's however patient on

## 2018-12-15 NOTE — DIETARY NOTE
Nutrition Short Note    Dietitian consult received for heart failure education. Appropriate education and handout provided. All questions answered. RD available PRN.     Carmen Cooper MS, RD, LDN

## 2018-12-15 NOTE — PROGRESS NOTES
[facility]  Progress Note     Hilda Jeffers Patient Status:  Inpatient    1955 MRN [id number]   [facility] 2NE-A Attending Mandi Matias MD   Hosp Day # 2 PCP Steff Aaron MD     Chief Complaint: Dyspnea    S: Patient feels Heparin Sodium (Porcine)  5,000 Units Intravenous Once   • Initial dose Heparin infusion  1,000 Units/hr Intravenous Once   • psyllium  1 packet Oral Daily   • metoprolol succinate  200 mg Oral Nightly   • eplerenone  25 mg Oral Daily   • amiodarone HCl  2

## 2018-12-15 NOTE — PROGRESS NOTES
Pulmonary Progress Note        [id number] - MRN: [id number] - Age: 59year old - : 1955        Last 24hrs: No events overnight, feels significantly better today    OBJECTIVE:   12/15/18  0200 12/15/18  0406 12/15/18 --   84.2  83.0  83.1   PLT   --    --    --   324.0  372.0  427.0   INR   --   2.26*  2.24*  2.21*  1.78*   --        Recent Labs      12/14/18   0539  12/14/18   1435  12/15/18   0703   NA  138   --   139   K  3.3*  4.1  3.1*   CL  105   --   100*   CO2 -cards following  -plan for RHC in near future  3.  Abnl CT chest  -pulm edema vs inflammatory disease vs PNA  -diuresis per cards  -await inflammatory w/u- ESR/CRP both elevated but nonspecific  -suspect, given pt's rapid improvement, that his infiltrate

## 2018-12-15 NOTE — PROGRESS NOTES
· [facility]      Subjective:  Dyspnea improving with diuresis    Objective:  128/71  Afebrile  AFib 80s   I/O - 6275  BUN/ cr 16/0.99  INR 1.78  Hgb 11.7    Cardiac:  S1 S2 irregular  Lungs:  Decreased BS bases  Abdomen: soft  Extremities: 2-

## 2018-12-15 NOTE — PLAN OF CARE
CARDIOVASCULAR - ADULT    • Maintains optimal cardiac output and hemodynamic stability Progressing    • Absence of cardiac arrhythmias or at baseline Progressing        Assumed pt care around 0730. Pt denies CP, SOB, dizziness or lightheadedness.  Pt stated

## 2018-12-16 PROCEDURE — 99232 SBSQ HOSP IP/OBS MODERATE 35: CPT | Performed by: HOSPITALIST

## 2018-12-16 RX ORDER — POTASSIUM CHLORIDE 20 MEQ/1
40 TABLET, EXTENDED RELEASE ORAL EVERY 4 HOURS
Status: COMPLETED | OUTPATIENT
Start: 2018-12-16 | End: 2018-12-16

## 2018-12-16 NOTE — PROGRESS NOTES
[facility]  Progress Note     Joan Cueva Patient Status:  Inpatient    1955 MRN [id number]   [facility] 2NE-A Attending Tammy Mcarthur MD   Hosp Day # 3 PCP Daniel Duran MD     Chief Complaint: Dyspnea    S: Patient feels input(s): TROP, CK in the last 168 hours. Imaging: Imaging data reviewed in Epic.     Medications:   • Potassium Chloride ER  40 mEq Oral Q4H   • psyllium  1 packet Oral Daily   • metoprolol succinate  200 mg Oral Nightly   • eplerenone  25 mg Oral

## 2018-12-16 NOTE — PLAN OF CARE
CARDIOVASCULAR - ADULT    • Maintains optimal cardiac output and hemodynamic stability Progressing    • Absence of cardiac arrhythmias or at baseline Progressing          A/O x 4  Room air during the day, 2-3 liters oxygen at night, refusing CPAP  afib at

## 2018-12-16 NOTE — PROGRESS NOTES
· [facility]      Subjective:  Dyspnea and edema improving    Objective:  102/44  Afebrile  AFib 80s   I/O - 6440  BUN/cr 16/0.99  PTT 39.6  plt 386    Cardiac:  S1 S2 irregular  Lungs:  Decreased BS bases  Abdomen: soft  Extremities: 2+ edema

## 2018-12-16 NOTE — PROGRESS NOTES
Pulmonary Progress Note        NAME: Rodolfo Zambrano - ROOM: 5688/4714-R - MRN: [id number] - Age: 59year old - : 1955        Last 24hrs: No events overnight, Feels better. Lost 25 lbs since admit.      OBJECTIVE:   12/15/18  2333 18  0424  Recent Labs      12/14/18   0539  12/14/18   1435  12/15/18   0703   NA  138   --   139   K  3.3*  4.1  3.1*   CL  105   --   100*   CO2  26.0   --   30.0   BUN  12   --   16   CA  8.2*   --   8.2*       No results for input(s): ALT, AST, ALB, AMYLASE, LIP -pulm edema vs inflammatory disease vs PNA  -diuresis per cards  -await inflammatory w/u- ESR/CRP both elevated but nonspecific  -suspect, given pt's rapid improvement, that his infiltrates are related to pulm edema  -would plan to repeat CT scan in 6-8 we

## 2018-12-17 PROCEDURE — 99232 SBSQ HOSP IP/OBS MODERATE 35: CPT | Performed by: HOSPITALIST

## 2018-12-17 RX ORDER — SODIUM CHLORIDE 9 MG/ML
INJECTION, SOLUTION INTRAVENOUS CONTINUOUS
Status: DISCONTINUED | OUTPATIENT
Start: 2018-12-17 | End: 2018-12-19

## 2018-12-17 RX ORDER — POTASSIUM CHLORIDE 20 MEQ/1
40 TABLET, EXTENDED RELEASE ORAL EVERY 4 HOURS
Status: COMPLETED | OUTPATIENT
Start: 2018-12-17 | End: 2018-12-17

## 2018-12-17 NOTE — CONSULTS
Heart Failure Navigator Progress Note    Patient was evaluated by the Heart Failure Coordinator for understanding, verbalization, demonstration, and recall of education related to heart failure, overall adherence to the behaviors neces no    Patient is adherent to 64 ounce fluid restriction - prior to this admission, pt was not monitoring his fluid intake. Eduction was provided.              ___ yes  _x__ no     Patient has sufficient funds to purchase medication                      _x__

## 2018-12-17 NOTE — PROGRESS NOTES
12/17/18 1313 12/17/18 1315 12/17/18 1317   Vital Signs   Pulse 89 86 76   Heart Rate Source Monitor Monitor --    Cardiac Rhythm Atrial fibrillation --  --    Resp 21 --  --    Respiratory Quality Normal --  --    /76 114/83 120/77   BP Location

## 2018-12-17 NOTE — PROGRESS NOTES
[facility]  Progress Note     Tevin Allen Patient Status:  Inpatient    1955 MRN [id number]   [facility] 2NE-A Attending Paulette Osuna MD   Hosp Day # 4 PCP Wicho Bone MD     Chief Complaint: Dyspnea    S: Patient feels 12/15/18   0703  12/17/18   0613   PTP  25.3*  21.3*  17.1*   INR  2.21*  1.78*  1.34*       No results for input(s): TROP, CK in the last 168 hours. Imaging: Imaging data reviewed in Epic.     Medications:   • Potassium Chloride ER  40 mEq Oral Q4H

## 2018-12-17 NOTE — PLAN OF CARE
CARDIOVASCULAR - ADULT    • Maintains optimal cardiac output and hemodynamic stability Progressing    • Absence of cardiac arrhythmias or at baseline Progressing          A/O x 4  Room air during the day, 3 liters at night, , refusing Cpap  Afib, on hep

## 2018-12-17 NOTE — PROGRESS NOTES
[facility]  Progress Note    Danyelle Fontan Patient Status:  Inpatient    1955 MRN [id number]   [facility] 2NE-A Attending Bryan Gilbert MD   Hosp Day # 4 PCP Cristian Valerio MD       Subjective:  Feels markedly better.  Breathing h Oral Q4H   • psyllium  1 packet Oral Daily   • metoprolol succinate  200 mg Oral Nightly   • eplerenone  25 mg Oral Daily   • amiodarone HCl  200 mg Oral Daily   • lisinopril  40 mg Oral Daily   • Pantoprazole Sodium  20 mg Oral QAM AC     • Continuous dos

## 2018-12-17 NOTE — PROGRESS NOTES
[address] [facility] Patient Status:  Inpatient    1955 MRN [id number]   [facility] 2NE-A Attending Ju Hunter MD   Hosp Day # 4 PCP Abby Leal MD     SUBJECTIVE: Feels much improved, no further dyspnea.       OBJECT deformity.                         LRJSR: GVPTBSW rate and rhythm, normal S1S2, no murmur.                          Abdomen: soft, non-tender, non-distended, positive BS.                         Extremity: No clubbing or cyanosis.  + edema                 PNA  -diuresis per cards  -await inflammatory w/u- ESR/CRP both elevated but nonspecific  -suspect, given pt's rapid improvement, that his infiltrates are related to pulm edema  -plan to repeat CT scan in 6-8 weeks as opt- if infiltrates are still present

## 2018-12-18 ENCOUNTER — APPOINTMENT (OUTPATIENT)
Dept: INTERVENTIONAL RADIOLOGY/VASCULAR | Facility: HOSPITAL | Age: 63
DRG: 287 | End: 2018-12-18
Attending: NURSE PRACTITIONER
Payer: COMMERCIAL

## 2018-12-18 PROCEDURE — 4A023N6 MEASUREMENT OF CARDIAC SAMPLING AND PRESSURE, RIGHT HEART, PERCUTANEOUS APPROACH: ICD-10-PCS | Performed by: INTERNAL MEDICINE

## 2018-12-18 PROCEDURE — 99232 SBSQ HOSP IP/OBS MODERATE 35: CPT | Performed by: HOSPITALIST

## 2018-12-18 PROCEDURE — 5A09357 ASSISTANCE WITH RESPIRATORY VENTILATION, LESS THAN 24 CONSECUTIVE HOURS, CONTINUOUS POSITIVE AIRWAY PRESSURE: ICD-10-PCS | Performed by: HOSPITALIST

## 2018-12-18 RX ORDER — HEPARIN SODIUM 5000 [USP'U]/ML
INJECTION, SOLUTION INTRAVENOUS; SUBCUTANEOUS
Status: DISCONTINUED
Start: 2018-12-18 | End: 2018-12-18 | Stop reason: WASHOUT

## 2018-12-18 RX ORDER — TORSEMIDE 20 MG/1
40 TABLET ORAL
Status: DISCONTINUED | OUTPATIENT
Start: 2018-12-18 | End: 2018-12-19

## 2018-12-18 RX ORDER — WARFARIN SODIUM 7.5 MG/1
7.5 TABLET ORAL
Status: COMPLETED | OUTPATIENT
Start: 2018-12-18 | End: 2018-12-18

## 2018-12-18 RX ORDER — HEPARIN SODIUM 5000 [USP'U]/ML
INJECTION, SOLUTION INTRAVENOUS; SUBCUTANEOUS
Status: COMPLETED
Start: 2018-12-18 | End: 2018-12-18

## 2018-12-18 RX ORDER — MIDAZOLAM HYDROCHLORIDE 1 MG/ML
INJECTION INTRAMUSCULAR; INTRAVENOUS
Status: COMPLETED
Start: 2018-12-18 | End: 2018-12-18

## 2018-12-18 RX ORDER — ISOSORBIDE DINITRATE 10 MG/1
10 TABLET ORAL
Status: DISCONTINUED | OUTPATIENT
Start: 2018-12-18 | End: 2018-12-20

## 2018-12-18 RX ORDER — EPLERENONE 25 MG/1
50 TABLET, FILM COATED ORAL DAILY
Status: DISCONTINUED | OUTPATIENT
Start: 2018-12-19 | End: 2018-12-20

## 2018-12-18 RX ORDER — LIDOCAINE HYDROCHLORIDE 10 MG/ML
INJECTION, SOLUTION EPIDURAL; INFILTRATION; INTRACAUDAL; PERINEURAL
Status: DISCONTINUED
Start: 2018-12-18 | End: 2018-12-18 | Stop reason: WASHOUT

## 2018-12-18 RX ORDER — HYDRALAZINE HYDROCHLORIDE 25 MG/1
25 TABLET, FILM COATED ORAL EVERY 8 HOURS SCHEDULED
Status: DISCONTINUED | OUTPATIENT
Start: 2018-12-18 | End: 2018-12-20

## 2018-12-18 RX ORDER — LIDOCAINE HYDROCHLORIDE 10 MG/ML
INJECTION, SOLUTION EPIDURAL; INFILTRATION; INTRACAUDAL; PERINEURAL
Status: COMPLETED
Start: 2018-12-18 | End: 2018-12-18

## 2018-12-18 NOTE — PROGRESS NOTES
[facility]  Progress Note     Skylarmargaux Stokes Patient Status:  Inpatient    1955 MRN [id number]   [facility] 2NE-A Attending Dwight Tejeda MD   Hosp Day # 5 PCP Abbe Mayo MD     Chief Complaint: Dyspnea    S: Patient feels interval not displayed. Estimated Creatinine Clearance: 51 mL/min (A) (based on SCr of 1.6 mg/dL (H)).     Recent Labs   Lab  12/15/18   0703  12/17/18   0613  12/18/18   0605   PTP  21.3*  17.1*  16.0*   INR  1.78*  1.34*  1.23*       No results for

## 2018-12-18 NOTE — PROGRESS NOTES
Pulmonary Progress Note      NAME: Sven Mccrary - ROOM: 0981/7629-R - MRN: [id number] - Age: 59year old - : 1955    Assessment/Plan:  1. Dyspnea   -hypoxia due to pulm edema primarily and has resolved with diuresis.  On RA now  -dyspnea evaluati normal S1S2, no murmur. Abdomen: soft, non-tender, non-distended, positive BS.    Extremity: no edema   Skin: no new rash   Neuro: normal    Recent Labs   Lab  12/15/18   0703   12/18/18   0605   RBC  4.48   < >  5.06   HGB  11.7*   < >  13.2   HCT  37.2

## 2018-12-18 NOTE — OPERATIVE REPORT
Right Heart Catheterization    Procedures:    Right Heart Catheterization    Indication:  PAH, morbid obesity, fluid overload, untreated severe ALBA  Access Site: Right Internal Jugular Vein      Sheath Size (Fr): 7    Complications:  None    Findings:  BP:

## 2018-12-18 NOTE — PROGRESS NOTES
[facility]  Advanced Heart Failure Progress Note    Florentin Flowers Patient Status:  Inpatient    1955 MRN [id number]   [facility]   [facility] Day # 5 PCP Boo Heck MD     Subjective results for input(s): BNPML in the last 168 hours.   BUN (mg/dL)   Date Value   12/18/2018 32 (H)   12/17/2018 28 (H)   12/16/2018 19     UREA NITROGEN (BUN) (mg/dL)   Date Value   05/07/2015 10   10/16/2013 10   07/23/2013 16     CREATININE (mg/dL)   Date JARRED BANKS, F.EDINC. Medical Director, Heart Failure Research, [address] Director, [address]  Seg[address], [address] [place] [address]

## 2018-12-19 ENCOUNTER — PATIENT OUTREACH (OUTPATIENT)
Dept: INTERNAL MEDICINE CLINIC | Facility: CLINIC | Age: 63
End: 2018-12-19

## 2018-12-19 PROCEDURE — 99253 IP/OBS CNSLTJ NEW/EST LOW 45: CPT | Performed by: INTERNAL MEDICINE

## 2018-12-19 PROCEDURE — 99232 SBSQ HOSP IP/OBS MODERATE 35: CPT | Performed by: HOSPITALIST

## 2018-12-19 RX ORDER — TORSEMIDE 20 MG/1
20 TABLET ORAL
Status: DISCONTINUED | OUTPATIENT
Start: 2018-12-20 | End: 2018-12-20

## 2018-12-19 RX ORDER — WARFARIN SODIUM 7.5 MG/1
7.5 TABLET ORAL
Status: DISCONTINUED | OUTPATIENT
Start: 2018-12-19 | End: 2018-12-19

## 2018-12-19 RX ORDER — WARFARIN SODIUM 10 MG/1
10 TABLET ORAL
Status: COMPLETED | OUTPATIENT
Start: 2018-12-19 | End: 2018-12-19

## 2018-12-19 RX ORDER — SODIUM CHLORIDE 9 MG/ML
INJECTION, SOLUTION INTRAVENOUS CONTINUOUS
Status: DISCONTINUED | OUTPATIENT
Start: 2018-12-19 | End: 2018-12-20

## 2018-12-19 NOTE — PROGRESS NOTES
Pulmonary Progress Note        [facility] [id number] - MRN: [id number] - Age: 59year old - : 1955        Last 24hrs: No events overnight, feels good today, hopeful to go home    OBJECTIVE:   18  2300 18  0020  1539  12/18/18   0605  12/19/18   0537   NA   --   136   --   135*  136   K  3.9  3.5*  4.2  3.8  3.6   CL   --   97*   --   98*  98*   CO2   --   31.0   --   32.0  31.0   BUN   --   28*   --   32*  36*   CA   --   8.8   --   9.2  8.9       No results for are still present would need bronch at that time  4. Reid: untreated. Non compliant with cpap  -REID protocol while here, has been declining  - will need to be readdressed as outpt. 5. H/o PE/ DVT:   - cont heparin gtt, coumadin on hold  6.  Dispo  -f/u w/

## 2018-12-19 NOTE — CONSULTS
[facility]  Rheumatology Report of Consultation  Raj Self Patient Status:  Inpatient    1955 MRN [id number]   [facility] 2NE-A Attending Arely Heard MD   [facility] Day # 6 PCP Kendell Clayton MD     Date of Admission: 2018 approximately 4 years ago. Right hip replaced 2010. A stomach sleeve placed 2 years ago for obesity after which he lost 100 pounds. IVC filter placed. Endoscopy in 2015 revealed erosive esophagitis with erosions and ulcers. FAMHX.   Mother had hyperten psyllium (METAMUCIL SMOOTH TEXTURE) 28 % packet 1 packet 1 packet Oral Daily   [COMPLETED] bumetanide (BUMEX) injection 4 mg 4 mg Intravenous Once   Metoprolol Succinate ER (Toprol XL) 24 hr tab 200 mg 200 mg Oral Nightly   amiodarone HCl (PACERONE) tab negative SSA negative. Scleroderma SCL 70-. DNA negative.   Lab Results   Component Value Date    CREATSERUM 1.82 12/19/2018    BUN 36 12/19/2018     12/19/2018    K 3.6 12/19/2018    CL 98 12/19/2018    CO2 31.0 12/19/2018     12/19/2018 patient. Media Hero M.D.    Cell [phone]  12/19/2018  5:00 PM

## 2018-12-19 NOTE — PROGRESS NOTES
[facility]  Progress Note     Dora Prabhakar Patient Status:  Inpatient    1955 MRN [id number]   [facility] 2NE-A Attending Sameer Millan MD   [facility] Day # 6 PCP Juliann Lopez MD     Chief Complaint: Dyspnea    S: Patient feels 1.34*  1.23*  1.23*       No results for input(s): TROP, CK in the last 168 hours. Imaging: Imaging data reviewed in Epic.     Medications:   • eplerenone  50 mg Oral Daily   • torsemide  40 mg Oral BID (Diuretic)   • isosorbide dinitrate  10 mg Ora

## 2018-12-19 NOTE — PLAN OF CARE
Pt denies pain. Pt is A & O x 4. Pt is a fib on tele, S1 and S2 present/ distant, heart rate steady and pt denies cardiac symptoms. Lung sounds clear/ diminished bilaterally in upper and lower lobes. Abdomen soft, non-tender and round. Last BM on 12/18/18.

## 2018-12-19 NOTE — PROGRESS NOTES
[facility]  Cardiology Progress Note    Subjective:  No chest pain or shortness of breath. Wants to go home. Blood pressure a bit low today at times. Feels dizzy intermittently - likely dry.   States last night he slept better than he has in a long last night. · H/o PE/DVT, on Coumadin.  VQ low probability (CTA inconclusive)  · HTN  · Morbid obesity with known ALBA - does not wear CPAP at home. Follows outpatient previously with Dr. Zak Crow from Pulmonary.   Will need outpatient f/u for re

## 2018-12-19 NOTE — RESPIRATORY THERAPY NOTE
ALBA - Equipment Use Daily Summary:                  . Set Mode: AUTO CPAP WITH C-FLEX                . Usage in hours: 4:51                . 90% Pressure (EPAP) level: 11.9                . 90% Insp. Pressure (IPAP): Oziel Boucher  AHI: 3.4

## 2018-12-20 ENCOUNTER — PRIOR ORIGINAL RECORDS (OUTPATIENT)
Dept: OTHER | Age: 63
End: 2018-12-20

## 2018-12-20 VITALS
SYSTOLIC BLOOD PRESSURE: 124 MMHG | HEART RATE: 98 BPM | DIASTOLIC BLOOD PRESSURE: 81 MMHG | WEIGHT: 315 LBS | HEIGHT: 71 IN | OXYGEN SATURATION: 98 % | TEMPERATURE: 99 F | RESPIRATION RATE: 18 BRPM | BODY MASS INDEX: 44.1 KG/M2

## 2018-12-20 PROCEDURE — 99239 HOSP IP/OBS DSCHRG MGMT >30: CPT | Performed by: HOSPITALIST

## 2018-12-20 RX ORDER — HYDRALAZINE HYDROCHLORIDE 10 MG/1
10 TABLET, FILM COATED ORAL EVERY 8 HOURS SCHEDULED
Status: DISCONTINUED | OUTPATIENT
Start: 2018-12-20 | End: 2018-12-20

## 2018-12-20 RX ORDER — METOPROLOL SUCCINATE 200 MG/1
200 TABLET, EXTENDED RELEASE ORAL NIGHTLY
Qty: 30 TABLET | Refills: 3 | Status: SHIPPED | OUTPATIENT
Start: 2018-12-20

## 2018-12-20 RX ORDER — ISOSORBIDE DINITRATE 10 MG/1
10 TABLET ORAL EVERY 8 HOURS
Status: DISCONTINUED | OUTPATIENT
Start: 2018-12-20 | End: 2018-12-20

## 2018-12-20 RX ORDER — LISINOPRIL 40 MG/1
40 TABLET ORAL DAILY
Qty: 30 TABLET | Refills: 3 | Status: SHIPPED | OUTPATIENT
Start: 2018-12-21

## 2018-12-20 RX ORDER — ISOSORBIDE DINITRATE 10 MG/1
10 TABLET ORAL EVERY 8 HOURS
Qty: 90 TABLET | Refills: 3 | Status: SHIPPED | OUTPATIENT
Start: 2018-12-20

## 2018-12-20 RX ORDER — TORSEMIDE 20 MG/1
20 TABLET ORAL
Qty: 60 TABLET | Refills: 3 | Status: SHIPPED | OUTPATIENT
Start: 2018-12-20 | End: 2019-01-02

## 2018-12-20 RX ORDER — AMIODARONE HYDROCHLORIDE 200 MG/1
200 TABLET ORAL DAILY
Qty: 30 TABLET | Refills: 3 | Status: SHIPPED | OUTPATIENT
Start: 2018-12-21

## 2018-12-20 RX ORDER — POTASSIUM CHLORIDE 20 MEQ/1
40 TABLET, EXTENDED RELEASE ORAL ONCE
Status: COMPLETED | OUTPATIENT
Start: 2018-12-20 | End: 2018-12-20

## 2018-12-20 RX ORDER — HYDRALAZINE HYDROCHLORIDE 10 MG/1
10 TABLET, FILM COATED ORAL EVERY 8 HOURS SCHEDULED
Qty: 90 TABLET | Refills: 3 | Status: SHIPPED | OUTPATIENT
Start: 2018-12-20

## 2018-12-20 RX ORDER — EPLERENONE 50 MG/1
50 TABLET, FILM COATED ORAL DAILY
Qty: 30 TABLET | Refills: 3 | Status: SHIPPED | OUTPATIENT
Start: 2018-12-21

## 2018-12-20 NOTE — PROGRESS NOTES
[address] [facility] Patient Status:  Inpatient    1955 MRN [id number]   [facility] 2NE-A Attending Kaitlyn Barfield MD   Hosp Day # 7 PCP Nate Medeiros MD     Pulm / Critical Care Progress Note     S: Pt states that he is d Lab  12/15/18   1135  12/16/18   0650  12/17/18   0613  12/18/18   0605   WBC  8.7   --   6.3  6.4   HGB  11.8*   --   12.6*  13.2   HCT  38.9   --   40.5  41.7   PLT  427.0  386.0  414.0  387.0     Recent Labs   Lab  12/18/18   0605  12/19/18   0537  12

## 2018-12-20 NOTE — RESPIRATORY THERAPY NOTE
ALBA - Equipment Use Daily Summary:                  . Set Mode: AUTO CPAP WITH C-FLEX                . Usage in hours: 4:00                . 90% Pressure (EPAP) level: 11.3                . 90% Insp. Pressure (IPAP): Gerardo Calero  AHI: 5.5

## 2018-12-20 NOTE — PLAN OF CARE
Pt denies pain. Pt is A & O x 4. Pt is a fib on tele w/ BBB, S1 and S2 present/ distant, heart rate steady and pt denies cardiac symptoms. Lung sounds clear/ diminished in bilateral upper and lower lobes. Continuing to monitor.  Abdomen soft, non-tender and

## 2018-12-20 NOTE — DISCHARGE SUMMARY
[facility] [place] HOSPITALIST  DISCHARGE SUMMARY     Raj Self Patient Status:  Inpatient    1955 MRN [id number]   [facility] 2NE-A Attending Arely Heard MD   [facility] Day # 7 PCP Kendell Clayton MD     Date of Admission: 2018  Date of Cathkarlie Tifton Bumex infusion with nearly 40 lb weight loss. Patient underwent RHC, meds were adjusted. He is now euvolemic. His creatinine is elevated but stable and acceptable. He will be discharged home with close outpatient follow up.     Lace+ Score: 71  59-90 Hig 12/21/2018  What changed:    · See the new instructions. · Another medication with the same name was removed. Continue taking this medication, and follow the directions you see here. Take 1 tablet (40 mg total) by mouth daily.    Quantity:  30 tablet [address] [id number]  [phone]    Go on 12/26/2018  Office visit with Mary SILVA on 12/26/18 at 1:15 pm    Desmond Sears MD  [address] [facility] [address] [place] [address]  [phone]    In 6 weeks      Appointments for Next 3

## 2018-12-20 NOTE — PROGRESS NOTES
[facility]  Cardiology Progress Note    Subjective:  Feeling much better today. Felt dizzy and weak yesterday with SBP's 80's. Today much better with adjusted meds. Creatinine down a bit. Hopes he can go home. INR remains therapeutic.       Object 80's.  Today improved with adjusted meds. Hopes to go home today. Will discuss w/ Dr. Shannon Card. Tentative plans for discharge later today. SABRINA Dolan  12/20/2018  9:26 AM  Agree with above assessment;  We will have to increase pre-and after-l

## 2018-12-21 ENCOUNTER — TELEPHONE (OUTPATIENT)
Dept: INTERNAL MEDICINE CLINIC | Facility: CLINIC | Age: 63
End: 2018-12-21

## 2018-12-21 NOTE — TELEPHONE ENCOUNTER
I received call from TCM Nurse and then called back the patient. He was having some lower BP readings w/ some lightheaded feeling. BP was in the 26D systolic, now it's in the 34S systolic at the time of me talking with him. Less lightheaded.   I told him

## 2018-12-24 ENCOUNTER — APPOINTMENT (OUTPATIENT)
Dept: LAB | Age: 63
End: 2018-12-24
Attending: INTERNAL MEDICINE
Payer: COMMERCIAL

## 2018-12-24 ENCOUNTER — PRIOR ORIGINAL RECORDS (OUTPATIENT)
Dept: OTHER | Age: 63
End: 2018-12-24

## 2018-12-24 DIAGNOSIS — T81.718A IATROGENIC PULMONARY EMBOLISM AND INFARCTION (HCC): ICD-10-CM

## 2018-12-24 DIAGNOSIS — I26.99 IATROGENIC PULMONARY EMBOLISM AND INFARCTION (HCC): ICD-10-CM

## 2018-12-24 DIAGNOSIS — I48.91 ATRIAL FIBRILLATION (HCC): ICD-10-CM

## 2018-12-24 LAB — INR: 1.53

## 2018-12-24 PROCEDURE — 36415 COLL VENOUS BLD VENIPUNCTURE: CPT

## 2018-12-24 PROCEDURE — 85610 PROTHROMBIN TIME: CPT

## 2018-12-26 ENCOUNTER — PRIOR ORIGINAL RECORDS (OUTPATIENT)
Dept: OTHER | Age: 63
End: 2018-12-26

## 2018-12-26 ENCOUNTER — OFFICE VISIT (OUTPATIENT)
Dept: INTERNAL MEDICINE CLINIC | Facility: CLINIC | Age: 63
End: 2018-12-26

## 2018-12-26 ENCOUNTER — HOSPITAL ENCOUNTER (OUTPATIENT)
Dept: LAB | Facility: HOSPITAL | Age: 63
Discharge: HOME | End: 2018-12-26
Attending: INTERNAL MEDICINE
Payer: COMMERCIAL

## 2018-12-26 VITALS
HEIGHT: 71 IN | HEART RATE: 81 BPM | TEMPERATURE: 98 F | DIASTOLIC BLOOD PRESSURE: 72 MMHG | RESPIRATION RATE: 16 BRPM | WEIGHT: 315 LBS | OXYGEN SATURATION: 98 % | BODY MASS INDEX: 44.1 KG/M2 | SYSTOLIC BLOOD PRESSURE: 110 MMHG

## 2018-12-26 DIAGNOSIS — Z09 HOSPITAL DISCHARGE FOLLOW-UP: ICD-10-CM

## 2018-12-26 DIAGNOSIS — I48.20 CHRONIC A-FIB (HCC): ICD-10-CM

## 2018-12-26 DIAGNOSIS — I50.811 ACUTE RIGHT-SIDED HEART FAILURE (HCC): Primary | ICD-10-CM

## 2018-12-26 DIAGNOSIS — Z99.89 OSA ON CPAP: ICD-10-CM

## 2018-12-26 DIAGNOSIS — G47.33 OSA ON CPAP: ICD-10-CM

## 2018-12-26 DIAGNOSIS — R76.8 POSITIVE ANA (ANTINUCLEAR ANTIBODY): ICD-10-CM

## 2018-12-26 DIAGNOSIS — E66.01 MORBID OBESITY WITH BMI OF 50.0-59.9, ADULT (HCC): ICD-10-CM

## 2018-12-26 PROCEDURE — 36415 COLL VENOUS BLD VENIPUNCTURE: CPT | Performed by: INTERNAL MEDICINE

## 2018-12-26 PROCEDURE — 99495 TRANSJ CARE MGMT MOD F2F 14D: CPT | Performed by: CLINICAL NURSE SPECIALIST

## 2018-12-26 PROCEDURE — 83880 ASSAY OF NATRIURETIC PEPTIDE: CPT | Performed by: INTERNAL MEDICINE

## 2018-12-26 PROCEDURE — 1111F DSCHRG MED/CURRENT MED MERGE: CPT | Performed by: CLINICAL NURSE SPECIALIST

## 2018-12-26 PROCEDURE — 80048 BASIC METABOLIC PNL TOTAL CA: CPT | Performed by: INTERNAL MEDICINE

## 2018-12-26 NOTE — PATIENT INSTRUCTIONS
1.  Follow up with [facility] as directed. Your next appointment is on 1/2/19 for an INR level. 2.  Please follow the currently prescribed coumadin dosing schedule: Sunday, Wednesday, Friday 5mg ---- Monday, Tuesday, Thursday, Saturday 2.5 mg  3.   R salt (sodium) in your diet. Salt causes your body to hold water. This makes your heart work harder as there is more fluid for the heart to pump. Limit your salt by doing the following:  · Limit canned, dried, packaged, and fast foods.   · Don't add salt to weigh yourself first thing in the morning after you empty your bladder, but before you eat breakfast. Your healthcare provider will show you how to track your weight.  He or she will also discuss with you when you should call if you have a sudden, unexpecte short of breath, needing more pillows to breathe)  · Frequent coughing that doesn't go away  · Feeling much more tired than usual  Call 911  Call 911 right away if you have:  · Severe shortness of breath, such that you can't catch your breath even while re called heart failure with reduced ejection fraction, or  HFrEF. Diastolic heart failure. The heart muscle becomes stiff. It doesn’t relax normally between contractions, which keeps the ventricles from filling with blood.  Ejection fraction is often in th measured to help diagnose heart failure. A healthy heart pumps at least half of the blood from the ventricles with each beat. This means a normal ejection fraction is around 50% to 70%.  Your doctor will calculate ejection fraction from an echocardiogram. more blood with each beat. So, more oxygen-rich blood travels to the rest of the body. · Aldosterone antagonists help alter hormones and decrease strain on the heart.   · Hydralazine and nitrates are two separate medicines used together to treat heart fail

## 2018-12-26 NOTE — PROGRESS NOTES
TRANSITIONAL CARE CLINIC PHARMACIST MEDICATION RECONCILIATION        Cummings Joseph MRN [id number]    1955 PCP Woo Godwin MD       Comments: Medication history completed in [address] [place] [address] by pharmacist. See updated med list below.  Angélica mg total) by mouth daily. • eplerenone 50 MG Oral Tab Take 1 tablet (50 mg total) by mouth daily. • hydrALAzine HCl 10 MG Oral Tab Take 1 tablet (10 mg total) by mouth every 8 (eight) hours. 12/26/2018:  Takes at [place], 2pm and 10 pm at same time as iso about interaction with warfarin. Plan:   Provided patient with Heart failure mediation education handout (see AVS).  Counseled on the possible interaction between the cascara sagrada supplement and warfarin and increased risk for bleeding if used togeth

## 2018-12-26 NOTE — PROGRESS NOTES
[facility]      [facility]ISTORY   CHIEF COMPLAINT: \"I'm feeling a lot better but still feel a little dizzy in the morning when I wake up. \"  HPI: Danyelle Ghosh is a 59year old male here today for hospital follow up. using his CPAP at night and is hoping to use something \"less bulky\" after he sees Dr. Isma Church (appointment rescheduled for 12/27/18).     Interactive contact within 2 business days post discharge first initiated on Date: 12/21/2018      Allergies:  No • Esophagus, Clay's    • Glucose intolerance (pre-diabetes)    • High blood pressure    • High cholesterol    • History of blood clots    • Obesity, unspecified    • Osteoarthrosis, unspecified whether generalized or localized, unspecified site    • O 12/18/2018 06:05 AM    .0 12/18/2018 06:05 AM     (H) 12/20/2018 05:44 AM     (L) 12/20/2018 05:44 AM    K 3.8 12/20/2018 05:44 AM     12/20/2018 05:44 AM    CO2 29.0 12/20/2018 05:44 AM    BUN 29 (H) 12/20/2018 05:44 AM    CREATS rhonchi or rales, normal respiratory effort, pulse ox on RA 95%  CARDIO: S1, S2, RRR without audible murmur  GI: + BS to all quadrants, no tenderness  MUSCULOSKELETAL: + ROM in all joints, no evidence of swelling, pain or deformity   EXTREMITIES: no cyanos mouth every 8 (eight) hours. Disp: 90 tablet Rfl: 3   isosorbide dinitrate 10 MG Oral Tab Take 1 tablet (10 mg total) by mouth every 8 (eight) hours.  Disp: 90 tablet Rfl: 3   Metoprolol Succinate  MG Oral Tablet 24 Hr Take 1 tablet (200 mg total) by 16:00     Approved by: Neda Xavier MD            Nm Lung Vq Vent / Perfusion Scan  (UZY=[id number])    Result Date: 12/13/2018  CONCLUSION:  No regions of perfusion defect or mismatch identified.   The study is considered very low probability for pulmonary of Significant Complications, Morbidity, and/or Mortality: moderate    Overall Risk:   moderate    Patient seen within 7 days from date of discharge. Discharge Disposition/Plan:   1. Transitional Care Clinic Visit: Next visit 1/2/19  2.   PCP Visit: Jorge Diggs

## 2018-12-27 LAB
BUN: 45 MG/DL
CALCIUM: 9.1 MG/DL
CHLORIDE: 106 MEQ/L
CREATININE, SERUM: 2.22 MG/DL
GLUCOSE: 104 MG/DL
POTASSIUM, SERUM: 4.5 MEQ/L
PROBNP: 760 PG/ML
SODIUM: 138 MEQ/L

## 2019-01-01 ENCOUNTER — APPOINTMENT (OUTPATIENT)
Dept: GENERAL RADIOLOGY | Age: 64
End: 2019-01-01
Attending: PHYSICIAN ASSISTANT
Payer: COMMERCIAL

## 2019-01-01 ENCOUNTER — HOSPITAL ENCOUNTER (OUTPATIENT)
Age: 64
Discharge: HOME | End: 2019-01-01
Payer: COMMERCIAL

## 2019-01-01 ENCOUNTER — APPOINTMENT (OUTPATIENT)
Dept: CARDIOLOGY | Age: 64
End: 2019-01-01

## 2019-01-01 ENCOUNTER — ANTI-COAG (OUTPATIENT)
Dept: CARDIOLOGY | Age: 64
End: 2019-01-01

## 2019-01-01 ENCOUNTER — APPOINTMENT (OUTPATIENT)
Dept: LAB | Age: 64
End: 2019-01-01
Attending: INTERNAL MEDICINE
Payer: COMMERCIAL

## 2019-01-01 ENCOUNTER — LAB ENCOUNTER (OUTPATIENT)
Dept: LAB | Age: 64
End: 2019-01-01
Attending: INTERNAL MEDICINE
Payer: COMMERCIAL

## 2019-01-01 ENCOUNTER — TELEPHONE (OUTPATIENT)
Dept: CARDIOLOGY | Age: 64
End: 2019-01-01

## 2019-01-01 ENCOUNTER — OFFICE VISIT (OUTPATIENT)
Dept: CARDIOLOGY | Age: 64
End: 2019-01-01

## 2019-01-01 ENCOUNTER — OFFICE VISIT (OUTPATIENT)
Dept: INTERNAL MEDICINE CLINIC | Facility: CLINIC | Age: 64
End: 2019-01-01

## 2019-01-01 ENCOUNTER — TELEPHONE (OUTPATIENT)
Dept: INTERNAL MEDICINE CLINIC | Facility: CLINIC | Age: 64
End: 2019-01-01

## 2019-01-01 ENCOUNTER — TELEPHONE (OUTPATIENT)
Dept: SURGERY | Facility: CLINIC | Age: 64
End: 2019-01-01

## 2019-01-01 ENCOUNTER — PRIOR ORIGINAL RECORDS (OUTPATIENT)
Dept: OTHER | Age: 64
End: 2019-01-01

## 2019-01-01 VITALS
DIASTOLIC BLOOD PRESSURE: 90 MMHG | WEIGHT: 315 LBS | HEIGHT: 71 IN | BODY MASS INDEX: 44.1 KG/M2 | SYSTOLIC BLOOD PRESSURE: 150 MMHG | HEART RATE: 72 BPM

## 2019-01-01 VITALS
HEIGHT: 71 IN | WEIGHT: 315 LBS | RESPIRATION RATE: 16 BRPM | HEART RATE: 65 BPM | TEMPERATURE: 98 F | OXYGEN SATURATION: 96 % | BODY MASS INDEX: 44.1 KG/M2 | SYSTOLIC BLOOD PRESSURE: 164 MMHG | DIASTOLIC BLOOD PRESSURE: 89 MMHG

## 2019-01-01 VITALS
DIASTOLIC BLOOD PRESSURE: 82 MMHG | HEIGHT: 68.75 IN | HEART RATE: 64 BPM | BODY MASS INDEX: 46.65 KG/M2 | TEMPERATURE: 99 F | SYSTOLIC BLOOD PRESSURE: 156 MMHG | WEIGHT: 315 LBS | RESPIRATION RATE: 16 BRPM

## 2019-01-01 VITALS
DIASTOLIC BLOOD PRESSURE: 74 MMHG | SYSTOLIC BLOOD PRESSURE: 112 MMHG | WEIGHT: 315 LBS | HEIGHT: 71 IN | BODY MASS INDEX: 44.1 KG/M2 | HEART RATE: 84 BPM

## 2019-01-01 VITALS
SYSTOLIC BLOOD PRESSURE: 140 MMHG | DIASTOLIC BLOOD PRESSURE: 88 MMHG | HEIGHT: 71 IN | HEART RATE: 56 BPM | WEIGHT: 315 LBS | BODY MASS INDEX: 44.1 KG/M2

## 2019-01-01 VITALS
BODY MASS INDEX: 44.1 KG/M2 | HEIGHT: 71 IN | DIASTOLIC BLOOD PRESSURE: 78 MMHG | HEART RATE: 84 BPM | SYSTOLIC BLOOD PRESSURE: 126 MMHG | WEIGHT: 315 LBS

## 2019-01-01 VITALS
SYSTOLIC BLOOD PRESSURE: 148 MMHG | WEIGHT: 315 LBS | DIASTOLIC BLOOD PRESSURE: 88 MMHG | HEIGHT: 71 IN | HEART RATE: 70 BPM | BODY MASS INDEX: 44.1 KG/M2

## 2019-01-01 VITALS
HEIGHT: 71 IN | DIASTOLIC BLOOD PRESSURE: 68 MMHG | WEIGHT: 315 LBS | BODY MASS INDEX: 44.1 KG/M2 | HEART RATE: 80 BPM | SYSTOLIC BLOOD PRESSURE: 110 MMHG

## 2019-01-01 VITALS
SYSTOLIC BLOOD PRESSURE: 130 MMHG | HEIGHT: 71 IN | DIASTOLIC BLOOD PRESSURE: 86 MMHG | HEART RATE: 79 BPM | WEIGHT: 315 LBS | BODY MASS INDEX: 44.1 KG/M2

## 2019-01-01 VITALS
HEIGHT: 71 IN | HEART RATE: 86 BPM | DIASTOLIC BLOOD PRESSURE: 68 MMHG | WEIGHT: 315 LBS | BODY MASS INDEX: 44.1 KG/M2 | SYSTOLIC BLOOD PRESSURE: 98 MMHG

## 2019-01-01 DIAGNOSIS — I26.99 IATROGENIC PULMONARY EMBOLISM AND INFARCTION (HCC): ICD-10-CM

## 2019-01-01 DIAGNOSIS — I48.0 PAROXYSMAL ATRIAL FIBRILLATION (HCC): Primary | ICD-10-CM

## 2019-01-01 DIAGNOSIS — Z51.81 ENCOUNTER FOR MONITORING AMIODARONE THERAPY: ICD-10-CM

## 2019-01-01 DIAGNOSIS — I48.0 PAF (PAROXYSMAL ATRIAL FIBRILLATION) (CMD): Primary | ICD-10-CM

## 2019-01-01 DIAGNOSIS — M25.461 EFFUSION OF RIGHT KNEE: Primary | ICD-10-CM

## 2019-01-01 DIAGNOSIS — Z79.899 ENCOUNTER FOR MONITORING AMIODARONE THERAPY: ICD-10-CM

## 2019-01-01 DIAGNOSIS — R10.12 ABDOMINAL WALL PAIN IN LEFT UPPER QUADRANT: ICD-10-CM

## 2019-01-01 DIAGNOSIS — I26.99 OTHER PULMONARY EMBOLISM WITHOUT ACUTE COR PULMONALE, UNSPECIFIED CHRONICITY (CMD): ICD-10-CM

## 2019-01-01 DIAGNOSIS — T81.718A IATROGENIC PULMONARY EMBOLISM AND INFARCTION (HCC): ICD-10-CM

## 2019-01-01 DIAGNOSIS — I48.91 ATRIAL FIBRILLATION (HCC): ICD-10-CM

## 2019-01-01 DIAGNOSIS — I48.20 CHRONIC A-FIB (HCC): ICD-10-CM

## 2019-01-01 DIAGNOSIS — I50.9 HEART FAILURE, UNSPECIFIED HF CHRONICITY, UNSPECIFIED HEART FAILURE TYPE (HCC): Primary | ICD-10-CM

## 2019-01-01 DIAGNOSIS — I48.3 TYPICAL ATRIAL FLUTTER (CMD): ICD-10-CM

## 2019-01-01 DIAGNOSIS — I50.812 CHRONIC RIGHT-SIDED HEART FAILURE (HCC): ICD-10-CM

## 2019-01-01 DIAGNOSIS — Z79.899 ENCOUNTER FOR LONG-TERM (CURRENT) USE OF OTHER MEDICATIONS: ICD-10-CM

## 2019-01-01 DIAGNOSIS — Z12.5 SCREENING FOR MALIGNANT NEOPLASM OF PROSTATE: ICD-10-CM

## 2019-01-01 DIAGNOSIS — R63.5 WEIGHT GAIN: ICD-10-CM

## 2019-01-01 DIAGNOSIS — Z00.00 ENCOUNTER FOR PREVENTATIVE ADULT HEALTH CARE EXAMINATION: Primary | ICD-10-CM

## 2019-01-01 DIAGNOSIS — I50.32 CHRONIC DIASTOLIC CONGESTIVE HEART FAILURE (CMD): ICD-10-CM

## 2019-01-01 DIAGNOSIS — Z00.00 ENCOUNTER FOR PREVENTATIVE ADULT HEALTH CARE EXAMINATION: ICD-10-CM

## 2019-01-01 DIAGNOSIS — I48.19 PERSISTENT ATRIAL FIBRILLATION (HCC): ICD-10-CM

## 2019-01-01 DIAGNOSIS — Z23 NEED FOR INFLUENZA VACCINATION: ICD-10-CM

## 2019-01-01 DIAGNOSIS — M25.561 RIGHT KNEE PAIN, UNSPECIFIED CHRONICITY: Primary | ICD-10-CM

## 2019-01-01 DIAGNOSIS — I48.0 PAROXYSMAL ATRIAL FIBRILLATION (HCC): ICD-10-CM

## 2019-01-01 DIAGNOSIS — I10 ESSENTIAL HYPERTENSION: ICD-10-CM

## 2019-01-01 LAB
ALT SERPL-CCNC: 23 U/L (ref 16–61)
AST SERPL-CCNC: 16 U/L (ref 15–37)
INR BLD: 1.75 (ref 0.9–1.1)
INR BLD: 2.36 (ref 0.9–1.1)
INR BLD: 3.6 (ref 0.9–1.1)
INR PPP: 1.74
INR PPP: 1.75
INR PPP: 1.8
INR PPP: 1.8
INR PPP: 1.88
INR PPP: 2.3
INR PPP: 2.79
INR PPP: 2.81
INR PPP: 3.6
PSA SERPL DL<=0.01 NG/ML-MCNC: 21.4 SECONDS (ref 12.5–14.7)
PSA SERPL DL<=0.01 NG/ML-MCNC: 27.3 SECONDS (ref 12.5–14.7)
PSA SERPL DL<=0.01 NG/ML-MCNC: 38.5 SECONDS (ref 12.5–14.7)
T4 FREE SERPL-MCNC: 1.2 NG/DL (ref 0.8–1.7)
TSI SER-ACNC: 1.77 MIU/ML (ref 0.36–3.74)

## 2019-01-01 PROCEDURE — 84460 ALANINE AMINO (ALT) (SGPT): CPT

## 2019-01-01 PROCEDURE — 84443 ASSAY THYROID STIM HORMONE: CPT

## 2019-01-01 PROCEDURE — 85025 COMPLETE CBC W/AUTO DIFF WBC: CPT

## 2019-01-01 PROCEDURE — 85610 PROTHROMBIN TIME: CPT

## 2019-01-01 PROCEDURE — 80053 COMPREHEN METABOLIC PANEL: CPT

## 2019-01-01 PROCEDURE — 36415 COLL VENOUS BLD VENIPUNCTURE: CPT

## 2019-01-01 PROCEDURE — 83036 HEMOGLOBIN GLYCOSYLATED A1C: CPT

## 2019-01-01 PROCEDURE — 84439 ASSAY OF FREE THYROXINE: CPT

## 2019-01-01 PROCEDURE — 99213 OFFICE O/P EST LOW 20 MIN: CPT

## 2019-01-01 PROCEDURE — 90471 IMMUNIZATION ADMIN: CPT | Performed by: INTERNAL MEDICINE

## 2019-01-01 PROCEDURE — 99214 OFFICE O/P EST MOD 30 MIN: CPT

## 2019-01-01 PROCEDURE — 90686 IIV4 VACC NO PRSV 0.5 ML IM: CPT | Performed by: INTERNAL MEDICINE

## 2019-01-01 PROCEDURE — 84450 TRANSFERASE (AST) (SGOT): CPT

## 2019-01-01 PROCEDURE — 99215 OFFICE O/P EST HI 40 MIN: CPT | Performed by: INTERNAL MEDICINE

## 2019-01-01 PROCEDURE — 93000 ELECTROCARDIOGRAM COMPLETE: CPT | Performed by: INTERNAL MEDICINE

## 2019-01-01 PROCEDURE — 73560 X-RAY EXAM OF KNEE 1 OR 2: CPT | Performed by: PHYSICIAN ASSISTANT

## 2019-01-01 PROCEDURE — 80061 LIPID PANEL: CPT

## 2019-01-01 PROCEDURE — 99396 PREV VISIT EST AGE 40-64: CPT | Performed by: INTERNAL MEDICINE

## 2019-01-01 RX ORDER — EPLERENONE 50 MG/1
50 TABLET, FILM COATED ORAL DAILY
Qty: 30 TABLET | Refills: 5 | Status: SHIPPED | OUTPATIENT
Start: 2019-01-01

## 2019-01-01 RX ORDER — WARFARIN SODIUM 2.5 MG/1
TABLET ORAL
Qty: 25 TABLET | Refills: 0 | Status: SHIPPED | OUTPATIENT
Start: 2019-01-01 | End: 2019-01-01 | Stop reason: SDUPTHER

## 2019-01-01 RX ORDER — ISOSORBIDE DINITRATE 10 MG/1
TABLET ORAL
Qty: 90 TABLET | Refills: 2 | Status: SHIPPED | OUTPATIENT
Start: 2019-01-01

## 2019-01-01 RX ORDER — OMEPRAZOLE 20 MG/1
CAPSULE, DELAYED RELEASE ORAL
COMMUNITY
Start: 2018-12-26

## 2019-01-01 RX ORDER — HYDRALAZINE HYDROCHLORIDE 10 MG/1
TABLET, FILM COATED ORAL
Qty: 540 TABLET | Refills: 1 | Status: SHIPPED | OUTPATIENT
Start: 2019-01-01 | End: 2020-01-01 | Stop reason: DRUGHIGH

## 2019-01-01 RX ORDER — LISINOPRIL 40 MG/1
40 TABLET ORAL DAILY
Qty: 15 TABLET | Refills: 0 | Status: CANCELLED | OUTPATIENT
Start: 2019-01-01

## 2019-01-01 RX ORDER — WARFARIN SODIUM 2.5 MG/1
TABLET ORAL
Qty: 44 TABLET | Refills: 0 | Status: SHIPPED | OUTPATIENT
Start: 2019-01-01 | End: 2019-01-01 | Stop reason: SDUPTHER

## 2019-01-01 RX ORDER — WARFARIN SODIUM 2.5 MG/1
TABLET ORAL
COMMUNITY
Start: 2018-11-16 | End: 2019-01-01 | Stop reason: SDUPTHER

## 2019-01-01 RX ORDER — TERBINAFINE HYDROCHLORIDE 250 MG/1
1 TABLET ORAL DAILY
COMMUNITY
Start: 2018-12-26

## 2019-01-01 RX ORDER — LISINOPRIL 40 MG/1
40 TABLET ORAL DAILY
Qty: 30 TABLET | Refills: 1 | Status: SHIPPED | OUTPATIENT
Start: 2019-01-01 | End: 2019-01-01 | Stop reason: SDUPTHER

## 2019-01-01 RX ORDER — HYDROCODONE BITARTRATE AND ACETAMINOPHEN 5; 325 MG/1; MG/1
1-2 TABLET ORAL EVERY 6 HOURS PRN
Qty: 12 TABLET | Refills: 0 | Status: SHIPPED | OUTPATIENT
Start: 2019-01-01 | End: 2019-01-01

## 2019-01-01 RX ORDER — LISINOPRIL 40 MG/1
40 TABLET ORAL DAILY
Qty: 15 TABLET | Refills: 0 | Status: SHIPPED | OUTPATIENT
Start: 2019-01-01 | End: 2019-01-01 | Stop reason: SDUPTHER

## 2019-01-01 RX ORDER — WARFARIN SODIUM 2.5 MG/1
TABLET ORAL
Qty: 60 TABLET | Refills: 1 | Status: SHIPPED | OUTPATIENT
Start: 2019-01-01 | End: 2019-01-01 | Stop reason: SDUPTHER

## 2019-01-01 RX ORDER — ISOSORBIDE DINITRATE 10 MG/1
TABLET ORAL
COMMUNITY
Start: 2019-01-18 | End: 2019-01-01 | Stop reason: SDUPTHER

## 2019-01-01 RX ORDER — AMIODARONE HYDROCHLORIDE 200 MG/1
200 TABLET ORAL DAILY
Qty: 30 TABLET | Refills: 1 | Status: SHIPPED | OUTPATIENT
Start: 2019-01-01 | End: 2019-01-01 | Stop reason: SDUPTHER

## 2019-01-01 RX ORDER — TORSEMIDE 20 MG/1
1 TABLET ORAL DAILY
COMMUNITY
Start: 2019-01-18 | End: 2020-01-01 | Stop reason: SDUPTHER

## 2019-01-01 RX ORDER — AMIODARONE HYDROCHLORIDE 200 MG/1
1 TABLET ORAL DAILY
COMMUNITY
Start: 2018-11-16 | End: 2019-01-01 | Stop reason: SDUPTHER

## 2019-01-01 RX ORDER — WARFARIN SODIUM 2.5 MG/1
TABLET ORAL
Qty: 44 TABLET | Refills: 0 | Status: SHIPPED | OUTPATIENT
Start: 2019-01-01 | End: 2019-01-01 | Stop reason: DRUGHIGH

## 2019-01-01 RX ORDER — AMIODARONE HYDROCHLORIDE 200 MG/1
200 TABLET ORAL DAILY
Qty: 30 TABLET | Refills: 0 | Status: CANCELLED | OUTPATIENT
Start: 2019-01-01

## 2019-01-01 RX ORDER — AMIODARONE HYDROCHLORIDE 200 MG/1
200 TABLET ORAL DAILY
Qty: 15 TABLET | Refills: 0 | Status: SHIPPED | OUTPATIENT
Start: 2019-01-01 | End: 2019-01-01 | Stop reason: SDUPTHER

## 2019-01-01 RX ORDER — AMIODARONE HYDROCHLORIDE 200 MG/1
200 TABLET ORAL DAILY
Qty: 15 TABLET | Refills: 0 | Status: CANCELLED | OUTPATIENT
Start: 2019-01-01

## 2019-01-01 RX ORDER — WARFARIN SODIUM 2.5 MG/1
TABLET ORAL
Qty: 45 TABLET | Refills: 2 | Status: SHIPPED | OUTPATIENT
Start: 2019-01-01 | End: 2019-01-01 | Stop reason: DRUGHIGH

## 2019-01-01 RX ORDER — ISOSORBIDE DINITRATE 10 MG/1
10 TABLET ORAL 3 TIMES DAILY
Qty: 90 TABLET | Refills: 2 | Status: SHIPPED | OUTPATIENT
Start: 2019-01-01 | End: 2019-01-01 | Stop reason: SDUPTHER

## 2019-01-01 RX ORDER — EPLERENONE 50 MG/1
50 TABLET, FILM COATED ORAL DAILY
Qty: 90 TABLET | Refills: 0 | Status: SHIPPED | OUTPATIENT
Start: 2019-01-01 | End: 2019-01-01 | Stop reason: SDUPTHER

## 2019-01-01 RX ORDER — METOPROLOL SUCCINATE 200 MG/1
200 TABLET, EXTENDED RELEASE ORAL NIGHTLY
Qty: 90 TABLET | Refills: 0 | Status: SHIPPED | OUTPATIENT
Start: 2019-01-01 | End: 2019-01-01 | Stop reason: SDUPTHER

## 2019-01-01 RX ORDER — AMIODARONE HYDROCHLORIDE 200 MG/1
200 TABLET ORAL DAILY
Qty: 30 TABLET | Refills: 0 | Status: SHIPPED | OUTPATIENT
Start: 2019-01-01 | End: 2019-01-01 | Stop reason: SDUPTHER

## 2019-01-01 RX ORDER — METOPROLOL SUCCINATE 200 MG/1
200 TABLET, EXTENDED RELEASE ORAL NIGHTLY
Qty: 30 TABLET | Refills: 5 | Status: SHIPPED | OUTPATIENT
Start: 2019-01-01 | End: 2020-01-01 | Stop reason: SDUPTHER

## 2019-01-01 RX ORDER — WARFARIN SODIUM 2.5 MG/1
TABLET ORAL
Qty: 40 TABLET | Refills: 0 | OUTPATIENT
Start: 2019-01-01

## 2019-01-01 RX ORDER — WARFARIN SODIUM 2.5 MG/1
TABLET ORAL
Qty: 40 TABLET | Refills: 0 | Status: SHIPPED | OUTPATIENT
Start: 2019-01-01 | End: 2019-01-01 | Stop reason: SDUPTHER

## 2019-01-01 RX ORDER — LISINOPRIL 40 MG/1
TABLET ORAL
Qty: 30 TABLET | Refills: 6 | Status: SHIPPED | OUTPATIENT
Start: 2019-01-01

## 2019-01-01 RX ORDER — METOPROLOL SUCCINATE 200 MG/1
TABLET, EXTENDED RELEASE ORAL
COMMUNITY
Start: 2018-12-26 | End: 2019-01-01 | Stop reason: SDUPTHER

## 2019-01-01 RX ORDER — WARFARIN SODIUM 5 MG/1
2.5-5 TABLET ORAL DAILY
Qty: 14 TABLET | Refills: 0 | Status: SHIPPED | OUTPATIENT
Start: 2019-01-01 | End: 2020-01-01

## 2019-01-01 RX ORDER — EPLERENONE 50 MG/1
TABLET, FILM COATED ORAL
COMMUNITY
Start: 2018-12-26 | End: 2019-01-01 | Stop reason: SDUPTHER

## 2019-01-01 RX ORDER — WARFARIN SODIUM 2.5 MG/1
TABLET ORAL
Qty: 90 TABLET | Refills: 0 | OUTPATIENT
Start: 2019-01-01

## 2019-01-01 RX ORDER — WARFARIN SODIUM 2.5 MG/1
TABLET ORAL
Qty: 40 TABLET | Refills: 0 | Status: CANCELLED | OUTPATIENT
Start: 2019-01-01

## 2019-01-01 RX ORDER — WARFARIN SODIUM 5 MG/1
TABLET ORAL
COMMUNITY
Start: 2018-12-26 | End: 2019-01-01 | Stop reason: DRUGHIGH

## 2019-01-01 RX ORDER — LISINOPRIL 40 MG/1
1 TABLET ORAL DAILY
COMMUNITY
Start: 2017-12-07 | End: 2019-01-01 | Stop reason: SDUPTHER

## 2019-01-01 RX ORDER — AMIODARONE HYDROCHLORIDE 200 MG/1
TABLET ORAL
Qty: 30 TABLET | Refills: 6 | Status: SHIPPED | OUTPATIENT
Start: 2019-01-01

## 2019-01-01 RX ORDER — HYDRALAZINE HYDROCHLORIDE 10 MG/1
TABLET, FILM COATED ORAL
COMMUNITY
Start: 2019-02-08 | End: 2019-01-01 | Stop reason: SDUPTHER

## 2019-01-01 ASSESSMENT — ENCOUNTER SYMPTOMS
COUGH: 0
ALLERGIC/IMMUNOLOGIC COMMENTS: NO NEW FOOD ALLERGIES
SUSPICIOUS LESIONS: 0
WEIGHT LOSS: 0
HEMOPTYSIS: 0
CHILLS: 0
BRUISES/BLEEDS EASILY: 0
HEMATOCHEZIA: 0
FEVER: 0
WEIGHT GAIN: 0

## 2019-01-02 ENCOUNTER — OFFICE VISIT (OUTPATIENT)
Dept: INTERNAL MEDICINE CLINIC | Facility: CLINIC | Age: 64
End: 2019-01-02

## 2019-01-02 ENCOUNTER — PRIOR ORIGINAL RECORDS (OUTPATIENT)
Dept: OTHER | Age: 64
End: 2019-01-02

## 2019-01-02 ENCOUNTER — APPOINTMENT (OUTPATIENT)
Dept: LAB | Age: 64
End: 2019-01-02
Attending: INTERNAL MEDICINE
Payer: COMMERCIAL

## 2019-01-02 VITALS
SYSTOLIC BLOOD PRESSURE: 120 MMHG | HEART RATE: 82 BPM | TEMPERATURE: 98 F | HEIGHT: 71 IN | DIASTOLIC BLOOD PRESSURE: 82 MMHG | BODY MASS INDEX: 44.1 KG/M2 | WEIGHT: 315 LBS | RESPIRATION RATE: 18 BRPM | OXYGEN SATURATION: 94 %

## 2019-01-02 DIAGNOSIS — I26.99 IATROGENIC PULMONARY EMBOLISM AND INFARCTION (HCC): ICD-10-CM

## 2019-01-02 DIAGNOSIS — R76.8 POSITIVE ANA (ANTINUCLEAR ANTIBODY): ICD-10-CM

## 2019-01-02 DIAGNOSIS — Z99.89 OSA ON CPAP: ICD-10-CM

## 2019-01-02 DIAGNOSIS — I50.811 ACUTE RIGHT-SIDED HEART FAILURE (HCC): Primary | ICD-10-CM

## 2019-01-02 DIAGNOSIS — I48.20 CHRONIC A-FIB (HCC): ICD-10-CM

## 2019-01-02 DIAGNOSIS — G47.33 OSA ON CPAP: ICD-10-CM

## 2019-01-02 DIAGNOSIS — Z79.01 ANTICOAGULATED ON COUMADIN: ICD-10-CM

## 2019-01-02 DIAGNOSIS — I48.91 ATRIAL FIBRILLATION (HCC): ICD-10-CM

## 2019-01-02 DIAGNOSIS — T81.718A IATROGENIC PULMONARY EMBOLISM AND INFARCTION (HCC): ICD-10-CM

## 2019-01-02 DIAGNOSIS — E66.01 MORBID OBESITY WITH BMI OF 50.0-59.9, ADULT (HCC): ICD-10-CM

## 2019-01-02 LAB
INR BLD: 2.24 (ref 0.9–1.1)
INR: 2.24
PSA SERPL DL<=0.01 NG/ML-MCNC: 25.5 SECONDS (ref 12.4–14.7)

## 2019-01-02 PROCEDURE — 85610 PROTHROMBIN TIME: CPT

## 2019-01-02 PROCEDURE — 99213 OFFICE O/P EST LOW 20 MIN: CPT | Performed by: CLINICAL NURSE SPECIALIST

## 2019-01-02 PROCEDURE — 36415 COLL VENOUS BLD VENIPUNCTURE: CPT

## 2019-01-02 RX ORDER — TORSEMIDE 20 MG/1
20 TABLET ORAL
COMMUNITY

## 2019-01-02 NOTE — PROGRESS NOTES
[facility] FOLLOW-UP VISIT     Florentin Flowers MRN [id number]    1955 PCP Boo Heck MD   Admission Date: 18   Discharge Date: 18  Hospital Discharge Diagnoses (since 12/3/2018)   None         CHIEF COMPLAINT: \" I'm Disp: 30 tablet Rfl: 3   torsemide 20 MG Oral Tab Take 1 tablet (20 mg total) by mouth BID (Diuretic). Disp: 60 tablet Rfl: 3   Warfarin Sodium 2.5 MG Oral Tab Take 2.5 mg by mouth See Admin Instructions.  Take 2.5 mg on Monday, Tuesday, Thursday and Saturd migraines, vertigo, weakness, numbness/tingling   PSYCHIATRIC: denies depression or anxiety, hallucinations, confusion, paranoia, suicidal ideation  HEMATOLOGIC: denies history of anemia, bruising, bleeding  ENDOCRINE: denies increased or decreased appetit amiodarone HCl 200 MG Oral Tab Take 1 tablet (200 mg total) by mouth daily. lisinopril 40 MG Oral Tab Take 1 tablet (40 mg total) by mouth daily. eplerenone 50 MG Oral Tab Take 1 tablet (50 mg total) by mouth daily.    hydrALAzine HCl 10 MG Oral Tab T

## 2019-01-02 NOTE — PATIENT INSTRUCTIONS
PATIENT INSTRUCTIONS    1. Get INR drawn TODAY 1/2/19 and call [facility] #[phone] with results.    2. Follow up with Dr. Gui Rausch and Dr. Jamal Barnett (Appointments already made)      PATIENT EDUCATION      What Are Snoring and Obstructive Sleep Apnea blood pressure, heart attack, or stroke. Problems in the nose and jaw  Problems in the structure of the nose may block breathing. A crooked (deviated) septum or swollen turbinates can make snoring worse or lead to apnea.  Also, a receding jaw may make the excessive bleeding. If the level is too low, you are at risk for developing a blood clot. If it's too high, you are at risk for bleeding. Make sure you keep all scheduled appointments for blood testing.  If you don’t, you will be at risk for bleeding proble that diet and medicines can affect your PT/INR level. · Your next PT/INR blood draw is due on _________________ (date) at ________________ (time) by ____________________ (name of healthcare provider or clinic).   · The name of the healthcare provider who i naproxen, ketoprofen, or other arthritis medicines  ? Some medicines for depression, cancer, HIV (protease inhibitors), diabetes, seizures, gout, high cholesterol, or thyroid replacement  ? Vitamins containing Vitamin K  ?  Herbal products such as ginkgo, Q cut, for example.   · A heavier-than-normal period or bleeding between periods  · Coughing or throwing up blood or something that looks like coffee grounds  · Nausea, bloating, or diarrhea  · Bleeding hemorrhoids  · Dark red or brown urine  · Red or black t

## 2019-01-10 ENCOUNTER — PRIOR ORIGINAL RECORDS (OUTPATIENT)
Dept: OTHER | Age: 64
End: 2019-01-10

## 2019-01-10 ENCOUNTER — OFFICE VISIT (OUTPATIENT)
Dept: INTERNAL MEDICINE CLINIC | Facility: CLINIC | Age: 64
End: 2019-01-10

## 2019-01-10 ENCOUNTER — APPOINTMENT (OUTPATIENT)
Dept: LAB | Age: 64
End: 2019-01-10
Attending: INTERNAL MEDICINE
Payer: COMMERCIAL

## 2019-01-10 VITALS
TEMPERATURE: 98 F | DIASTOLIC BLOOD PRESSURE: 60 MMHG | SYSTOLIC BLOOD PRESSURE: 112 MMHG | BODY MASS INDEX: 44.1 KG/M2 | WEIGHT: 315 LBS | HEIGHT: 71 IN | HEART RATE: 80 BPM

## 2019-01-10 DIAGNOSIS — T81.718A IATROGENIC PULMONARY EMBOLISM AND INFARCTION (HCC): ICD-10-CM

## 2019-01-10 DIAGNOSIS — E66.01 CLASS 3 SEVERE OBESITY DUE TO EXCESS CALORIES WITH SERIOUS COMORBIDITY AND BODY MASS INDEX (BMI) OF 50.0 TO 59.9 IN ADULT (HCC): ICD-10-CM

## 2019-01-10 DIAGNOSIS — Z99.89 OSA ON CPAP: ICD-10-CM

## 2019-01-10 DIAGNOSIS — I48.19 PERSISTENT ATRIAL FIBRILLATION (HCC): ICD-10-CM

## 2019-01-10 DIAGNOSIS — Z23 FLU VACCINE NEED: ICD-10-CM

## 2019-01-10 DIAGNOSIS — I50.812 CHRONIC RIGHT-SIDED HEART FAILURE (HCC): Primary | ICD-10-CM

## 2019-01-10 DIAGNOSIS — I48.91 ATRIAL FIBRILLATION (HCC): ICD-10-CM

## 2019-01-10 DIAGNOSIS — G47.33 OSA ON CPAP: ICD-10-CM

## 2019-01-10 DIAGNOSIS — I26.99 IATROGENIC PULMONARY EMBOLISM AND INFARCTION (HCC): ICD-10-CM

## 2019-01-10 DIAGNOSIS — Z79.01 ANTICOAGULATED ON COUMADIN: ICD-10-CM

## 2019-01-10 DIAGNOSIS — I10 ESSENTIAL HYPERTENSION: ICD-10-CM

## 2019-01-10 PROBLEM — R06.00 DOE (DYSPNEA ON EXERTION): Status: RESOLVED | Noted: 2018-12-13 | Resolved: 2019-01-10

## 2019-01-10 PROBLEM — J18.9 COMMUNITY ACQUIRED PNEUMONIA, UNSPECIFIED LATERALITY: Status: RESOLVED | Noted: 2018-12-13 | Resolved: 2019-01-10

## 2019-01-10 PROBLEM — I48.20 CHRONIC A-FIB (HCC): Status: RESOLVED | Noted: 2018-12-13 | Resolved: 2019-01-10

## 2019-01-10 PROBLEM — Z28.21 INFLUENZA VACCINE REFUSED: Status: RESOLVED | Noted: 2017-12-19 | Resolved: 2019-01-10

## 2019-01-10 PROBLEM — R09.02 HYPOXIA: Status: RESOLVED | Noted: 2018-12-13 | Resolved: 2019-01-10

## 2019-01-10 LAB
BUN: 29 MG/DL
CALCIUM: 8.9 MG/DL
CHLORIDE: 101 MEQ/L
CREATININE, SERUM: 1.54 MG/DL
GLUCOSE: 109 MG/DL
INR BLD: 2.31 (ref 0.9–1.1)
INR: 2.3
POTASSIUM, SERUM: 3.8 MEQ/L
PSA SERPL DL<=0.01 NG/ML-MCNC: 26.2 SECONDS (ref 12.4–14.7)
SODIUM: 134 MEQ/L

## 2019-01-10 PROCEDURE — 90686 IIV4 VACC NO PRSV 0.5 ML IM: CPT | Performed by: INTERNAL MEDICINE

## 2019-01-10 PROCEDURE — 36415 COLL VENOUS BLD VENIPUNCTURE: CPT

## 2019-01-10 PROCEDURE — 90471 IMMUNIZATION ADMIN: CPT | Performed by: INTERNAL MEDICINE

## 2019-01-10 PROCEDURE — 85610 PROTHROMBIN TIME: CPT

## 2019-01-10 PROCEDURE — 99214 OFFICE O/P EST MOD 30 MIN: CPT | Performed by: INTERNAL MEDICINE

## 2019-01-10 NOTE — PROGRESS NOTES
Patient presents with:  TCM (Transition Of Care Management): [facility] 12/13/18 - 12/20/18      HPI: Brittani Jimenez presents today for [facility] f/u. He was hospitalized from 12/13/18 - 12/20/18 w/ acute Right-sided heart failure.   He was diuresed 40 nightly., Disp: 30 tablet, Rfl: 3  •  Warfarin Sodium 2.5 MG Oral Tab, Take 2.5 mg by mouth See Admin Instructions.  Take 2.5 mg on Monday, Tuesday, Thursday and Saturday , Disp: , Rfl:   •  Warfarin Sodium 5 MG Oral Tab, Take 5 mg by mouth See Admin Lee Archuleta new issues. 2. Persistent AF on anticoagulation w/ Warfarin - Stable on prescription medication and cardiology/coumadin clinic f/u. No new issues. 3. HTN - Stable on prescription medication. No new issues. 4. ALBA on CPAP - Stable and per Pulm.   No new

## 2019-01-10 NOTE — PATIENT INSTRUCTIONS
Maria E Bennett,    1. Referrals are good to go for both Dr. Anupama Bailey and Dr. Zee Maldonado. 2. Continue prescription medication as directed. 3. Keep pushing for healthy living and lifestyle. Weight loss will continue to occur. It's all about mindset! 4.  We will conne

## 2019-01-14 NOTE — OPERATIVE REPORT
Right Heart Catheterization with Vasodilator Challenge    Procedures:    Right Heart Catheterization  Vasodilator Challenge    Indication: Morbid Obesity, untreated ALBA;  Severely decompensated HF  Access Site: Right Internal Jugular Vein    Sheath Size (Fr

## 2019-01-18 ENCOUNTER — PRIOR ORIGINAL RECORDS (OUTPATIENT)
Dept: OTHER | Age: 64
End: 2019-01-18

## 2019-01-18 ENCOUNTER — MYAURORA ACCOUNT LINK (OUTPATIENT)
Dept: OTHER | Age: 64
End: 2019-01-18

## 2019-01-18 ENCOUNTER — HOSPITAL ENCOUNTER (OUTPATIENT)
Dept: LAB | Facility: HOSPITAL | Age: 64
Discharge: HOME | End: 2019-01-18
Attending: INTERNAL MEDICINE
Payer: COMMERCIAL

## 2019-01-18 LAB
ANION GAP SERPL CALC-SCNC: 7 MMOL/L (ref 0–18)
BUN BLD-MCNC: 54 MG/DL (ref 8–20)
BUN/CREAT SERPL: 25 (ref 10–20)
CALCIUM BLD-MCNC: 9.1 MG/DL (ref 8.3–10.3)
CHLORIDE SERPL-SCNC: 104 MMOL/L (ref 101–111)
CO2 SERPL-SCNC: 26 MMOL/L (ref 22–32)
CREAT BLD-MCNC: 2.16 MG/DL (ref 0.7–1.3)
GLUCOSE BLD-MCNC: 125 MG/DL (ref 70–99)
INR BLD: 2.65 (ref 0.9–1.1)
INR: 2.6
OSMOLALITY SERPL CALC.SUM OF ELEC: 300 MOSM/KG (ref 275–295)
POTASSIUM SERPL-SCNC: 4.6 MMOL/L (ref 3.6–5.1)
PRO-BETA NATRIURETIC PEPTIDE: 598 PG/ML (ref ?–125)
PSA SERPL DL<=0.01 NG/ML-MCNC: 29.1 SECONDS (ref 12.4–14.7)
SODIUM SERPL-SCNC: 137 MMOL/L (ref 136–144)

## 2019-01-18 PROCEDURE — 85610 PROTHROMBIN TIME: CPT | Performed by: INTERNAL MEDICINE

## 2019-01-18 PROCEDURE — 80048 BASIC METABOLIC PNL TOTAL CA: CPT | Performed by: INTERNAL MEDICINE

## 2019-01-18 PROCEDURE — 83880 ASSAY OF NATRIURETIC PEPTIDE: CPT | Performed by: INTERNAL MEDICINE

## 2019-01-18 PROCEDURE — 36415 COLL VENOUS BLD VENIPUNCTURE: CPT | Performed by: INTERNAL MEDICINE

## 2019-01-21 ENCOUNTER — TELEPHONE (OUTPATIENT)
Dept: INTERNAL MEDICINE CLINIC | Facility: CLINIC | Age: 64
End: 2019-01-21

## 2019-01-21 DIAGNOSIS — R70.0 ELEVATED SED RATE: ICD-10-CM

## 2019-01-21 DIAGNOSIS — R79.82 ELEVATED C-REACTIVE PROTEIN (CRP): ICD-10-CM

## 2019-01-21 DIAGNOSIS — R76.8 POSITIVE ANA (ANTINUCLEAR ANTIBODY): Primary | ICD-10-CM

## 2019-01-21 NOTE — TELEPHONE ENCOUNTER
Referral pended for sign off for Dr Shivani Norris. Pt with positive MAXINE  elevated sed rate and crp on 12/14/18.

## 2019-01-23 ENCOUNTER — OFFICE VISIT (OUTPATIENT)
Dept: RHEUMATOLOGY | Facility: CLINIC | Age: 64
End: 2019-01-23

## 2019-01-23 ENCOUNTER — PRIOR ORIGINAL RECORDS (OUTPATIENT)
Dept: OTHER | Age: 64
End: 2019-01-23

## 2019-01-23 VITALS
HEART RATE: 78 BPM | BODY MASS INDEX: 44.1 KG/M2 | WEIGHT: 315 LBS | RESPIRATION RATE: 20 BRPM | SYSTOLIC BLOOD PRESSURE: 124 MMHG | DIASTOLIC BLOOD PRESSURE: 78 MMHG | HEIGHT: 71 IN

## 2019-01-23 DIAGNOSIS — I48.19 PERSISTENT ATRIAL FIBRILLATION (HCC): ICD-10-CM

## 2019-01-23 DIAGNOSIS — E66.01 CLASS 3 SEVERE OBESITY DUE TO EXCESS CALORIES WITH SERIOUS COMORBIDITY AND BODY MASS INDEX (BMI) OF 50.0 TO 59.9 IN ADULT (HCC): ICD-10-CM

## 2019-01-23 DIAGNOSIS — N18.2 STAGE 2 CHRONIC KIDNEY DISEASE: ICD-10-CM

## 2019-01-23 DIAGNOSIS — R76.8 POSITIVE ANA (ANTINUCLEAR ANTIBODY): Primary | ICD-10-CM

## 2019-01-23 DIAGNOSIS — Z96.653 STATUS POST BILATERAL KNEE REPLACEMENTS: ICD-10-CM

## 2019-01-23 DIAGNOSIS — Z96.641 STATUS POST RIGHT HIP REPLACEMENT: ICD-10-CM

## 2019-01-23 DIAGNOSIS — I50.812 CHRONIC RIGHT-SIDED HEART FAILURE (HCC): ICD-10-CM

## 2019-01-23 DIAGNOSIS — Z79.01 ANTICOAGULATED ON COUMADIN: ICD-10-CM

## 2019-01-23 PROCEDURE — 99213 OFFICE O/P EST LOW 20 MIN: CPT | Performed by: INTERNAL MEDICINE

## 2019-01-23 NOTE — PATIENT INSTRUCTIONS
Currently you have a positive MAXINE test, but it appears to be either a false positive, possibly due from the use of hydralazine medicine you are using for your heart failure and blood pressure. There is no sign of lupus in your case.   No sign of any other

## 2019-01-23 NOTE — PROGRESS NOTES
[facility]  Dr. Heaven Gaspar Progress Note     Subjective: Jhonathan Martell is a(n) 59year old male.    Current complaints: Patient presents with:  Establish Care: hospital f/y, positive MAXINE, no c/o joint pains, has had right hip and both knees relace  F sign of lupus in your case. No sign of any other autoimmune disease. You do have heart failure that is being managed by Dr. Alma Rid[facility]. Continue to take Coumadin as a blood thinner.   Since you are on Coumadin for pain you can take o

## 2019-01-25 ENCOUNTER — APPOINTMENT (OUTPATIENT)
Dept: LAB | Age: 64
End: 2019-01-25
Attending: INTERNAL MEDICINE
Payer: COMMERCIAL

## 2019-01-25 ENCOUNTER — PRIOR ORIGINAL RECORDS (OUTPATIENT)
Dept: OTHER | Age: 64
End: 2019-01-25

## 2019-01-25 DIAGNOSIS — I26.99 IATROGENIC PULMONARY EMBOLISM AND INFARCTION (HCC): ICD-10-CM

## 2019-01-25 DIAGNOSIS — T81.718A IATROGENIC PULMONARY EMBOLISM AND INFARCTION (HCC): ICD-10-CM

## 2019-01-25 DIAGNOSIS — I48.91 ATRIAL FIBRILLATION (HCC): ICD-10-CM

## 2019-01-25 LAB
INR BLD: 3.45 (ref 0.9–1.1)
INR: 3.4
PSA SERPL DL<=0.01 NG/ML-MCNC: 35.8 SECONDS (ref 12.4–14.7)

## 2019-01-25 PROCEDURE — 85610 PROTHROMBIN TIME: CPT

## 2019-01-25 PROCEDURE — 36415 COLL VENOUS BLD VENIPUNCTURE: CPT

## 2019-01-30 LAB
BUN: 54 MG/DL
CALCIUM: 9.1 MG/DL
CHLORIDE: 104 MEQ/L
CREATININE, SERUM: 2.16 MG/DL
GLUCOSE: 125 MG/DL
POTASSIUM, SERUM: 4.6 MEQ/L
PROBNP: 598 PG/ML
SODIUM: 137 MEQ/L

## 2019-01-30 NOTE — HISTORICAL OFFICE NOTE
SANNA CARTAGENA  : 1955  ACCOUNT:  [id number]  [phone]  PCP: Dr. Reinaldo Crockett     TODAY'S DATE: 2019  DICTATED BY:  Abigail Harrow Frommelt, APN]      CHIEF COMPLAINT: [Post Right Heart Catheterization.]    HPI:    [On 2019tavon Álvarez hip replacement, knee replacement, gastric sleeve 2016 and May    PAST CV HISTORY: 2016, atrial flutter, catheter ablation atrial flutter, dyslipidemia, hypertension, IVC filter placed 5/2016, paroxysmal atrial fibrillation, pulmonary embolus, RBBB, remove (1/2/19) 2.2, (1/10/19) 2.3, repeat weekly INRs   6. History of PE/DVT, continue warfarin  7. Obesity    ASSESSMENT:  1. Atrial flutter, unspecified  2. Coumadin Management, MHS  3. Coumadin management, Noncompliant  4. Hyperlipidemia, mixed  5.  Hyperten

## 2019-01-30 NOTE — HISTORICAL OFFICE NOTE
Jb Farmer  : 1955  ACCOUNT:  [id number]  [phone]  PCP: Dr. Flor Win     TODAY'S DATE: 2018  DICTATED BY:  [Dr. Maryuri Barrera: [Followup of Other pulmonary embolism without acute cor pulmonale and Followup of Unspec hematochezia. : no hematuria. INTEG: no new rashes, lesions. MS: no limiting arthritis. NEURO: no localized deficits. HEM/LYMPH: denies easy bruising. ALL: no new food or enviornmental allergies.       PAST HISTORY: GERD, sleep apnea, morbid obesity, hip dehydration and pneumonia. ASSESSMENT:  1. Atrial flutter, unspecified  2. Coumadin Management, MHS  3. Coumadin management, Noncompliant  4. Hyperlipidemia, mixed  5. Hypertension  6. Obesity, morbid  7.  Other pulmonary embolism without acute cor pulmo

## 2019-02-01 ENCOUNTER — HOSPITAL ENCOUNTER (OUTPATIENT)
Dept: INTERVENTIONAL RADIOLOGY/VASCULAR | Facility: HOSPITAL | Age: 64
Discharge: HOME | End: 2019-02-01
Attending: INTERNAL MEDICINE | Admitting: INTERNAL MEDICINE
Payer: COMMERCIAL

## 2019-02-01 ENCOUNTER — PRIOR ORIGINAL RECORDS (OUTPATIENT)
Dept: OTHER | Age: 64
End: 2019-02-01

## 2019-02-01 ENCOUNTER — ANESTHESIA EVENT (OUTPATIENT)
Dept: INTERVENTIONAL RADIOLOGY/VASCULAR | Facility: HOSPITAL | Age: 64
End: 2019-02-01
Payer: COMMERCIAL

## 2019-02-01 VITALS
WEIGHT: 315 LBS | RESPIRATION RATE: 17 BRPM | TEMPERATURE: 98 F | SYSTOLIC BLOOD PRESSURE: 133 MMHG | HEART RATE: 57 BPM | BODY MASS INDEX: 44.1 KG/M2 | DIASTOLIC BLOOD PRESSURE: 73 MMHG | HEIGHT: 71 IN | OXYGEN SATURATION: 100 %

## 2019-02-01 DIAGNOSIS — I48.91 ATRIAL FIBRILLATION, UNSPECIFIED TYPE (HCC): ICD-10-CM

## 2019-02-01 LAB
ATRIAL RATE: 60 BPM
INR: 2.8
INR: 2.8 (ref 0.8–1.3)
P AXIS: 10 DEGREES
P-R INTERVAL: 188 MS
Q-T INTERVAL: 494 MS
QRS DURATION: 172 MS
QTC CALCULATION (BEZET): 494 MS
R AXIS: 23 DEGREES
T AXIS: -19 DEGREES
VENTRICULAR RATE: 60 BPM

## 2019-02-01 PROCEDURE — 5A2204Z RESTORATION OF CARDIAC RHYTHM, SINGLE: ICD-10-PCS | Performed by: INTERNAL MEDICINE

## 2019-02-01 PROCEDURE — 93005 ELECTROCARDIOGRAM TRACING: CPT

## 2019-02-01 PROCEDURE — 92960 CARDIOVERSION ELECTRIC EXT: CPT

## 2019-02-01 PROCEDURE — 85610 PROTHROMBIN TIME: CPT

## 2019-02-01 PROCEDURE — 93010 ELECTROCARDIOGRAM REPORT: CPT | Performed by: INTERNAL MEDICINE

## 2019-02-01 RX ORDER — SODIUM CHLORIDE 9 MG/ML
INJECTION, SOLUTION INTRAVENOUS CONTINUOUS
Status: DISCONTINUED | OUTPATIENT
Start: 2019-02-01 | End: 2019-02-01

## 2019-02-01 RX ORDER — MIDAZOLAM HYDROCHLORIDE 1 MG/ML
INJECTION INTRAMUSCULAR; INTRAVENOUS
Status: COMPLETED
Start: 2019-02-01 | End: 2019-02-01

## 2019-02-01 RX ADMIN — SODIUM CHLORIDE: 9 INJECTION, SOLUTION INTRAVENOUS at 08:45:00

## 2019-02-01 NOTE — ANESTHESIA PREPROCEDURE EVALUATION
PRE-OP EVALUATION    Patient Name: Rachna Valdez    Pre-op Diagnosis: * No pre-op diagnosis entered *    * No procedures listed *    * No surgeons found in log *    Pre-op vitals reviewed.   Temp: 97.8 °F (36.6 °C)  Pulse: 67  Resp: 19  BP: 156/91  SpO2: Endo/Other    Negative endo/other ROS. Pulmonary                    (+) sleep apnea       Neuro/Psych    Negative neuro/psych ROS.                                 Past Surgical History:   Procedure Laterality Date   • COLONOSCOP FB  TM distance: 4 - 6 cm  Neck ROM: full Cardiovascular      Rhythm: irregular  Rate: abnormal     Dental    No notable dental history.          Pulmonary            (+) decreased breath sounds         Other findings            ASA: 4   Plan: general  NPO

## 2019-02-01 NOTE — PROGRESS NOTES
Successful cardioversion at bedside with Dr Hayde Forde and Dr Ct Guzman anesthesia. See anesthesia record. NSR confirmed by EKG. Pt tolerated procedure well. Awake and alert, eating. Neuro intact. IV dc'd with catheter intact.  Reviewed d/c instructions with pt and wife

## 2019-02-01 NOTE — PROCEDURES
PROCEDURE(S) PERFORMED:    1. Cardioversion. 2.     Sedation     :  Alek Ortiz MD     ANESTHESIA:  IV sedation. INDICATION:  Persistent atrial fibrillation. COMPLICATIONS:  None.      METHODS:  The patient was brought to the outpatient

## 2019-02-01 NOTE — ANESTHESIA POSTPROCEDURE EVALUATION
[address] Patient Status:  Outpatient in a Bed   Age/Gender 59year old male MRN [id number]   Location [facility] Attending No att. providers found   [facility] Day # 0 PCP Sue Bentley MD       Anesthesia Post-

## 2019-02-01 NOTE — H&P
[facility]  H&P    Rodolfo Zambrano Patient Status:  Outpatient in a Bed    1955 MRN [id number]   [facility] Gabriel [facility] MD   Hosp Day # 0 PCP Fidelia Harris MD • COLONOSCOPY N/A 3/11/2015    Performed by Talya Lim MD at [facility] ENDOSCOPY   • ESOPHAGOGASTRODUODENOSCOPY (EGD) N/A 3/11/2015    Performed by Talya Lim MD at [address] [facility]  02/01/2010    total hip replacement ri deficits. Neck: No JVD, carotids 2+ no bruits. Cardiac: Regular rate and rhythm, S1, S2 normal, no murmur, rub or gallop. Lungs: Clear without wheezes, rales, rhonchi or dullness. Normal excursions and effort. Abdomen: Soft, non-tender.    Extremities:

## 2019-02-08 ENCOUNTER — MYAURORA ACCOUNT LINK (OUTPATIENT)
Dept: OTHER | Age: 64
End: 2019-02-08

## 2019-02-08 ENCOUNTER — HOSPITAL ENCOUNTER (OUTPATIENT)
Dept: LAB | Facility: HOSPITAL | Age: 64
Discharge: HOME | End: 2019-02-08
Attending: INTERNAL MEDICINE
Payer: COMMERCIAL

## 2019-02-08 ENCOUNTER — PRIOR ORIGINAL RECORDS (OUTPATIENT)
Dept: OTHER | Age: 64
End: 2019-02-08

## 2019-02-08 LAB
INR: 4.1
POC INR: 4.1 (ref 0.8–1.3)

## 2019-02-08 PROCEDURE — 85610 PROTHROMBIN TIME: CPT | Performed by: INTERNAL MEDICINE

## 2019-05-06 PROBLEM — I26.99 PULMONARY EMBOLUS (CMD): Status: ACTIVE | Noted: 2019-01-01

## 2019-06-12 NOTE — ED PROVIDER NOTES
Patient Seen in: [facility]    History   Patient presents with:  Knee Pain  Swelling    Stated Complaint: rt knee swelling started 2 nights ago    HPI    44-year-old male with history of DVT, A. fib, hypertension, hyperlipidemia REPLACEMENT SURGERY  8/2013    Left   • KNEE REPLACEMENT SURGERY  2/2014    Right   • OTHER      gastric sleeve   • OTHER SURGICAL HISTORY  5/23/16    Lap Sleeve Gastrectomy by Dr. Renetta Peña @ [address] [place] [address]  3/2015    er are nontender to palpation. One single blister-like bump noted to left shin. Neurological: He is alert and oriented to person, place, and time. Skin: Skin is warm and dry. Capillary refill takes less than 2 seconds.    Psychiatric: He has a normal mood

## 2019-06-12 NOTE — TELEPHONE ENCOUNTER
Pt seen in UC today for right knee pain and swelling. Referred to Dr Merissa Manuel. Referral pended for sign off.

## 2019-06-12 NOTE — TELEPHONE ENCOUNTER
Cosme Vazquez M.D. Patient has made an appointment upcoming with Dr Tim Dunn; per Dr Shireen Ingram he needs to see this type of specialist; patient's wife stopped into office to request referral for upcoming appointment in a few weeks/late July 2019.  P

## 2019-06-12 NOTE — ED INITIAL ASSESSMENT (HPI)
Right knee pain - started Monday, swelling , this morning swelling worse, states he cannot put weight on it due to pain . Pt ambulates without assistance. Left leg- bump blackish discoloration. Painful when touched. Early this  Morning. Bump was noted.

## 2019-06-12 NOTE — TELEPHONE ENCOUNTER
Patient went to IC and also needs a referral for [employer] as well; please request from doctor

## 2019-11-21 NOTE — TELEPHONE ENCOUNTER
Left message with the following: We hope you are doing well. We miss you and this is a reminder to schedule your follow-up visits with our providers. Your continued success and good health is very important to us.     Please call Gisell Storey at [facility]

## 2019-12-09 NOTE — PROGRESS NOTES
Hilda Jeffers is a 62year old male. HPI:   Patient presents with:  Physical: Non fasting; Abdominal Pain: Pt c/o discomfort and sensitivity to left side of abdomen. Patient presents for CPX/wellness examination. Diet: Wife cooks food.     Exerc Tab, Take 1 tablet (50 mg total) by mouth daily. , Disp: 30 tablet, Rfl: 3  •  hydrALAzine HCl 10 MG Oral Tab, Take 1 tablet (10 mg total) by mouth every 8 (eight) hours. , Disp: 90 tablet, Rfl: 3  •  isosorbide dinitrate 10 MG Oral Tab, Take 1 tablet (10 mg smokeless tobacco. He reports previous alcohol use of about 2.5 standard drinks of alcohol per week. He reports that he does not use drugs.   Wt Readings from Last 6 Encounters:  12/09/19 : (!) 377 lb (171 kg)  06/12/19 : (!) 364 lb (165.1 kg)  01/30/19 : ( had CHF exacerbation. He denies worsening orthopnea, SOB, PND, LE edema. Will check chest x-ray. He only takes diuretics a few times a week as he cannot tolerate the frequent urination if he takes it daily. - XR CHEST PA + LAT CHEST (CPT=71046);  Future

## 2019-12-09 NOTE — PATIENT INSTRUCTIONS
- Get blood work done when fasting (at least 8 hours, water and medications only)  - If potassium levels are low/low normal, we may consider prescription potassium supplement to help with your leg cramps  - Otherwise you can try over the counter Coenzyme Q

## 2020-01-01 ENCOUNTER — ANTI-COAG (OUTPATIENT)
Dept: CARDIOLOGY | Age: 65
End: 2020-01-01

## 2020-01-01 ENCOUNTER — HOSPITAL ENCOUNTER (OUTPATIENT)
Dept: LAB | Facility: HOSPITAL | Age: 65
Discharge: HOME | End: 2020-01-01
Attending: INTERNAL MEDICINE
Payer: COMMERCIAL

## 2020-01-01 ENCOUNTER — APPOINTMENT (OUTPATIENT)
Dept: LAB | Age: 65
End: 2020-01-01
Attending: INTERNAL MEDICINE
Payer: COMMERCIAL

## 2020-01-01 ENCOUNTER — TELEPHONE (OUTPATIENT)
Dept: CARDIOLOGY | Age: 65
End: 2020-01-01

## 2020-01-01 ENCOUNTER — OFFICE VISIT (OUTPATIENT)
Dept: CARDIOLOGY | Age: 65
End: 2020-01-01

## 2020-01-01 ENCOUNTER — TELEPHONE (OUTPATIENT)
Dept: INTERNAL MEDICINE CLINIC | Facility: CLINIC | Age: 65
End: 2020-01-01

## 2020-01-01 ENCOUNTER — HOSPITAL ENCOUNTER (OUTPATIENT)
Age: 65
Discharge: ANOTHER HOSPITAL | End: 2020-01-01
Attending: FAMILY MEDICINE
Payer: COMMERCIAL

## 2020-01-01 ENCOUNTER — APPOINTMENT (OUTPATIENT)
Dept: GENERAL RADIOLOGY | Facility: HOSPITAL | Age: 65
End: 2020-01-01
Attending: EMERGENCY MEDICINE
Payer: COMMERCIAL

## 2020-01-01 VITALS
WEIGHT: 315 LBS | DIASTOLIC BLOOD PRESSURE: 72 MMHG | SYSTOLIC BLOOD PRESSURE: 132 MMHG | HEIGHT: 71 IN | BODY MASS INDEX: 44.1 KG/M2 | HEART RATE: 76 BPM

## 2020-01-01 VITALS
SYSTOLIC BLOOD PRESSURE: 220 MMHG | TEMPERATURE: 98 F | RESPIRATION RATE: 18 BRPM | OXYGEN SATURATION: 99 % | DIASTOLIC BLOOD PRESSURE: 85 MMHG | HEART RATE: 69 BPM

## 2020-01-01 DIAGNOSIS — G47.33 OSA ON CPAP: ICD-10-CM

## 2020-01-01 DIAGNOSIS — I48.0 PAROXYSMAL ATRIAL FIBRILLATION (HCC): ICD-10-CM

## 2020-01-01 DIAGNOSIS — Z79.01 ANTICOAGULATED ON COUMADIN: ICD-10-CM

## 2020-01-01 DIAGNOSIS — I48.0 PAF (PAROXYSMAL ATRIAL FIBRILLATION) (CMD): Primary | ICD-10-CM

## 2020-01-01 DIAGNOSIS — I48.19 PERSISTENT ATRIAL FIBRILLATION (CMD): ICD-10-CM

## 2020-01-01 DIAGNOSIS — R06.02 SHORTNESS OF BREATH: ICD-10-CM

## 2020-01-01 DIAGNOSIS — N18.2 STAGE 2 CHRONIC KIDNEY DISEASE: ICD-10-CM

## 2020-01-01 DIAGNOSIS — I10 ESSENTIAL HYPERTENSION: Primary | ICD-10-CM

## 2020-01-01 DIAGNOSIS — E78.2 HYPERLIPIDEMIA, MIXED: ICD-10-CM

## 2020-01-01 DIAGNOSIS — I26.99 PULMONARY EMBOLISM, UNSPECIFIED CHRONICITY, UNSPECIFIED PULMONARY EMBOLISM TYPE, UNSPECIFIED WHETHER ACUTE COR PULMONALE PRESENT (CMD): ICD-10-CM

## 2020-01-01 DIAGNOSIS — I26.99 PULMONARY INFARCTION (HCC): ICD-10-CM

## 2020-01-01 DIAGNOSIS — I10 UNCONTROLLED HYPERTENSION: Primary | ICD-10-CM

## 2020-01-01 DIAGNOSIS — R76.8 POSITIVE ANA (ANTINUCLEAR ANTIBODY): ICD-10-CM

## 2020-01-01 DIAGNOSIS — I48.0 PAF (PAROXYSMAL ATRIAL FIBRILLATION) (CMD): ICD-10-CM

## 2020-01-01 LAB
ATRIAL RATE: 70 BPM
INR BLD: 1.64 (ref 0.9–1.1)
INR BLD: 2.92 (ref 0.9–1.1)
INR BLD: 3.8 (ref 0.9–1.1)
INR BLD: 4.11 (ref 0.9–1.1)
INR BLD: 4.81 (ref 0.9–1.1)
INR PPP: 1.64
INR PPP: 2.92 (ref 2–3)
INR PPP: 3.8
INR PPP: 4.11
INR PPP: 4.81
INR PPP: 6.84
P AXIS: 2 DEGREES
P-R INTERVAL: 182 MS
POC INR: 6.3 (ref 0.8–1.3)
PSA SERPL DL<=0.01 NG/ML-MCNC: 20.3 SECONDS (ref 12.5–14.7)
PSA SERPL DL<=0.01 NG/ML-MCNC: 32.5 SECONDS (ref 12.5–14.7)
PSA SERPL DL<=0.01 NG/ML-MCNC: 40.3 SECONDS (ref 12.5–14.7)
PSA SERPL DL<=0.01 NG/ML-MCNC: 42.9 SECONDS (ref 12.5–14.7)
PSA SERPL DL<=0.01 NG/ML-MCNC: 48.8 SECONDS (ref 12.5–14.7)
Q-T INTERVAL: 468 MS
QRS DURATION: 160 MS
QTC CALCULATION (BEZET): 505 MS
R AXIS: 7 DEGREES
T AXIS: -14 DEGREES
VENTRICULAR RATE: 70 BPM

## 2020-01-01 PROCEDURE — 99214 OFFICE O/P EST MOD 30 MIN: CPT

## 2020-01-01 PROCEDURE — 36415 COLL VENOUS BLD VENIPUNCTURE: CPT

## 2020-01-01 PROCEDURE — 85610 PROTHROMBIN TIME: CPT

## 2020-01-01 PROCEDURE — 93010 ELECTROCARDIOGRAM REPORT: CPT

## 2020-01-01 PROCEDURE — 93005 ELECTROCARDIOGRAM TRACING: CPT

## 2020-01-01 PROCEDURE — 99214 OFFICE O/P EST MOD 30 MIN: CPT | Performed by: NURSE PRACTITIONER

## 2020-01-01 PROCEDURE — 3075F SYST BP GE 130 - 139MM HG: CPT | Performed by: NURSE PRACTITIONER

## 2020-01-01 PROCEDURE — 3078F DIAST BP <80 MM HG: CPT | Performed by: NURSE PRACTITIONER

## 2020-01-01 PROCEDURE — 71045 X-RAY EXAM CHEST 1 VIEW: CPT | Performed by: EMERGENCY MEDICINE

## 2020-01-01 RX ORDER — TORSEMIDE 20 MG/1
20 TABLET ORAL EVERY OTHER DAY
Status: SHIPPED | COMMUNITY
Start: 2020-01-01

## 2020-01-01 RX ORDER — WARFARIN SODIUM 5 MG/1
2.5-5 TABLET ORAL DAILY
Qty: 22 TABLET | Refills: 0 | Status: SHIPPED | OUTPATIENT
Start: 2020-01-01 | End: 2020-01-01 | Stop reason: SDUPTHER

## 2020-01-01 RX ORDER — ALPRAZOLAM 0.25 MG/1
0.25 TABLET ORAL NIGHTLY PRN
Qty: 10 TABLET | Refills: 0 | Status: SHIPPED | OUTPATIENT
Start: 2020-01-01

## 2020-01-01 RX ORDER — METOPROLOL SUCCINATE 200 MG/1
200 TABLET, EXTENDED RELEASE ORAL NIGHTLY
Qty: 90 TABLET | Refills: 3 | Status: SHIPPED | OUTPATIENT
Start: 2020-01-01

## 2020-01-01 RX ORDER — METOPROLOL TARTRATE 100 MG/1
100 TABLET ORAL
COMMUNITY
Start: 2018-11-16 | End: 2020-01-01 | Stop reason: ALTCHOICE

## 2020-01-01 RX ORDER — WARFARIN SODIUM 5 MG/1
TABLET ORAL
Qty: 90 TABLET | Refills: 3 | Status: SHIPPED | OUTPATIENT
Start: 2020-01-01

## 2020-01-01 RX ORDER — AMLODIPINE BESYLATE 10 MG/1
10 TABLET ORAL
COMMUNITY
Start: 2018-11-16 | End: 2020-01-01 | Stop reason: ALTCHOICE

## 2020-01-01 RX ORDER — HYDRALAZINE HYDROCHLORIDE 50 MG/1
50 TABLET, FILM COATED ORAL 3 TIMES DAILY
Qty: 90 TABLET | Refills: 11 | Status: SHIPPED | OUTPATIENT
Start: 2020-01-01

## 2020-01-01 ASSESSMENT — PATIENT HEALTH QUESTIONNAIRE - PHQ9
SUM OF ALL RESPONSES TO PHQ9 QUESTIONS 1 AND 2: 0
SUM OF ALL RESPONSES TO PHQ9 QUESTIONS 1 AND 2: 0
1. LITTLE INTEREST OR PLEASURE IN DOING THINGS: NOT AT ALL
2. FEELING DOWN, DEPRESSED OR HOPELESS: NOT AT ALL
SUM OF ALL RESPONSES TO PHQ9 QUESTIONS 1 AND 2: 0
1. LITTLE INTEREST OR PLEASURE IN DOING THINGS: NOT AT ALL
2. FEELING DOWN, DEPRESSED OR HOPELESS: NOT AT ALL

## 2020-01-01 ASSESSMENT — ENCOUNTER SYMPTOMS
EYES NEGATIVE: 1
NEUROLOGICAL NEGATIVE: 1
SYNCOPE: 0
RESPIRATORY NEGATIVE: 1
CONSTITUTIONAL NEGATIVE: 1
INSOMNIA: 1
COUGH: 0
BLOATING: 0
GASTROINTESTINAL NEGATIVE: 1
NEAR-SYNCOPE: 0
NERVOUS/ANXIOUS: 1
FEVER: 0
SHORTNESS OF BREATH: 0
ABDOMINAL PAIN: 0
ENDOCRINE NEGATIVE: 1

## 2020-01-26 NOTE — ED PROVIDER NOTES
Patient Seen in: [facility]      History   Patient presents with:  Hypertension    Stated Complaint: HIGH BP/SHAKING    HPI    This 60-year-old male with a history for hypertension, atrial fibrillation, DVT, sleep apnea presents Past Surgical History:   Procedure Laterality Date   • COLONOSCOPY N/A 3/11/2015    Performed by Kathia Vila MD at [facility] ENDOSCOPY   • ESOPHAGOGASTRODUODENOSCOPY (EGD) N/A 3/11/2015    Performed by Kathia Vila MD at [facility] ENDOSCOPY   • conjunctiva normal.  EARS: Tympanic membranes normal, EAC's normal.  NOSE: Turbinates normal, no bleeding noted. PHARYNX:  No eythema or exudates, airway patent, uvula midline  NECK:  No cervical lymphadenopathy.  No thyromegaly,  HEART: Regular rate and r

## 2020-01-26 NOTE — ED INITIAL ASSESSMENT (HPI)
Patient to ER from immediate care for further evaluation of HTN and THADDEUS, worsening over the last week. Patient reports he hasn't been able to sleep over the last week. Denies any headache/dizziness/chest pain.  Breathing is unlabored, even and regular at th

## 2020-01-26 NOTE — ED INITIAL ASSESSMENT (HPI)
C/O high blood pressure that started last night. Sts that he woke up and was shaking. Took his BP and is was over 473 systolic. Sts that he has been under a lot of stress. Hx of HTN.  Denies HA, dizziness, C/P, N/V.

## 2020-01-26 NOTE — ED PROVIDER NOTES
Patient Seen in: [facility] Emergency Department      History   Patient presents with:  Dyspnea THADDEUS SOB    Stated Complaint: sob    HPI    52-year-old obese  male presents to the emergency department.   He has been under a great deal of situa Unspecified essential hypertension    • Unspecified sleep apnea Edward split night 9-2-15    AHI 97 Sao2 romelia 39% CPAP 17 [employer]    • Visual impairment               Past Surgical History:   Procedure Laterality Date   • COLONOSCOPY N/A 3/11/2015    Perf his family is at bedside. His HEENT exam reveals mildly dry oral mucosa his neck is supple his lungs are clear to auscultation though he has diminished breath sounds secondary to body habitus.   His heart has a regular rate and rhythm, his abdomen is obese PORTABLE  (CPT=71045)   Final Result    PROCEDURE:  XR CHEST AP PORTABLE  (CPT=71045)         TECHNIQUE:  AP chest radiograph was obtained. COMPARISON:  JOHNY HUITRON, XR CHEST PA + LAT CHEST (CPT=71046),     12/13/2018, 11:43.          INDICATION a few days, follow-up with his primary care physician he was given a short course of Lorazepam to take at moments of increased stress to try and mitigate some of the blood pressure response to his stress.               Disposition and Plan     Clinical Impr

## 2020-01-27 NOTE — TELEPHONE ENCOUNTER
Pt called to schedule ER follow-up with Dr Eva Salas. Appointment given 1/30 at 2:15. Pt states he also has a cardiology appointment scheduled for tomorrow.

## 2020-01-27 NOTE — TELEPHONE ENCOUNTER
Patient went to ED yesterday with SOB and high BP; he would like a nurse to call him to discuss his plan of care with Dr Kwok Bare

## 2020-01-28 PROBLEM — N18.2 STAGE 2 CHRONIC KIDNEY DISEASE: Status: ACTIVE | Noted: 2019-01-23

## 2020-01-28 PROBLEM — R76.8 POSITIVE ANA (ANTINUCLEAR ANTIBODY): Status: ACTIVE | Noted: 2020-01-01

## 2020-01-28 PROBLEM — I48.19 PERSISTENT ATRIAL FIBRILLATION (CMD): Status: ACTIVE | Noted: 2018-08-16

## 2020-01-30 NOTE — PROGRESS NOTES
Patient presents with:  ER F/U      HPI: Nicolasa Day presents today for situation stress disorder f/u. The diagnosis was made recently in the ER on 1/26/20. Dealing w/ concerning news regarding his employment.   Has been having profound stress, anxiety, and ins tablet, Rfl: 3  •  eplerenone 50 MG Oral Tab, Take 1 tablet (50 mg total) by mouth daily. , Disp: 30 tablet, Rfl: 3  •  isosorbide dinitrate 10 MG Oral Tab, Take 1 tablet (10 mg total) by mouth every 8 (eight) hours. , Disp: 90 tablet, Rfl: 3  •  Metoprolol agrees to and understands the plan as outlined above. He was also afforded the time and opportunity to ask questions, which were then answered to the best of my ability. Andie Hill.  Shanique Dumas 75, [address] [facility] Board of Internal Medicine  Member, Jaxson Crow

## 2020-01-31 NOTE — TELEPHONE ENCOUNTER
Patient calling in calling to schedule his 1 month follow up with Dr Lissett Ward. Next opening is on 03/12/2020, however, pt believes that will be too far out, considering the type of medicine he was prescribed.     Is it okay for pt to wait until 03/12 to be

## 2020-02-02 NOTE — TELEPHONE ENCOUNTER
He can do a 15 min slot at the 1-month f/u point. Taras Howell. Efrain Marina MD  Diplomate, American Board of Internal Medicine  Member, [employer] College of [address] [facility]  [address]  [facility],[address] Floor, [address], [facility], [address] [place] [address]  T: [phone]

## 2020-02-13 NOTE — TELEPHONE ENCOUNTER
1. I will sign orders. 2. [facility]  staff - Please send condolences card to family. 3. Lastly, please submit for updated status within Epic. Lieutenant Bose.  Gui Rausch MD  Diplomate, [facility] [place] Board of Internal Medicine  Member, [facility]

## 2020-02-13 NOTE — TELEPHONE ENCOUNTER
Page received from [place], pt passed away this am appears to be of natural causes. Would like Dr. Eva Salas to sign death certificate. Can be reached at [phone].

## 2020-06-25 ENCOUNTER — APPOINTMENT (OUTPATIENT)
Dept: CARDIOLOGY | Age: 65
End: 2020-06-25

## 2021-02-19 ENCOUNTER — ANTI-COAG (OUTPATIENT)
Dept: CARDIOLOGY | Age: 66
End: 2021-02-19

## 2024-03-24 NOTE — TELEPHONE ENCOUNTER
Call patient. Would he like to do generic Viagra thru [employer]. It is completely legit and I've got a lot of my patients getting the medication this way.   He can get the 50 mg dose at a quantity of 40 tablets for $64.  I would highly
Patient called to follow up on refill  Please call
Patient called to request refill for Sildenafil Citrate 20 Mg.  Patient would like it sent to [facility] in [place]
Pt informed and agreeable in trying [address]. Please provide written script.  ty
actual

## (undated) NOTE — LETTER
[facility] [address], [address]  Consent for Procedure/Sedation    Date:     Time:       1. I authorize the performance upon Rachna Valdez the following:  Cardioversion     2.  I authorize Dr. Archana Mills (and whomever is designated a Witness: _________________________      Date: ___________________    Printed: 10/17/2018   8:57 AM  Patient Name: Rodolfo Zambrano        : 1955       Medical Record #: [id number]

## (undated) NOTE — ED AVS SNAPSHOT
Ring Jasbir   MRN: [id number]    Department:  [facility] Emergency Department   Date of Visit:  1/26/2020           Disclosure     Insurance plans vary and the physician(s) referred by the ER may not be covered by your plan.  Please contact yo tell this physician (or your personal doctor if your instructions are to return to your personal doctor) about any new or lasting problems. The primary care or specialist physician will see patients referred from the [facility] Emergency Department.  Wing Perez

## (undated) NOTE — LETTER
[facility]  [address]a [address] [facility], [address] [place] [address]    Consent for Anesthesia   1.   ITevin agree to be cared for by an anesthesiologist, who is specially trained to monitor me and give me medicine to put me to sleep or keep me comfort vision, nerves, or muscles and in extremely rare instances death. 5. My doctor has explained to me other choices available to me for my care (alternatives).   6. Pregnant Patients (“epidural”):  I understand that the risks of having an epidural (medicine g

## (undated) NOTE — MR AVS SNAPSHOT
[place]  [address] [facility] [address]  [address] [facility] [address] [phone]               Thank you for choosing us for your health care visit with Dagoberto Bower MD.  We are glad to serve you and happy to provide you with this s schedule your appointment. If you are confident that your benefit plan will not require a referral or authorization, such as [place] Medicaid, please feel free to schedule your appointment immediately.  However, if you are unsure about the requirements - Follow up with Dr. Fede Huang (Vascular Surgery/Wound care) for your surgical wound  - Follow up with Dr. Uma Holcomb as scheduled. It was a pleasure seeing you in the clinic today.   Thank you for choosing the [facility] office for your heal medications prescribed for you. Read the directions carefully, and ask your doctor or other care provider to review them with you.             MyChart     Visit [facility]  You can access your MyChart to more actively manage your health care and view more deta

## (undated) NOTE — ED AVS SNAPSHOT
Fiorella Bishop   MRN: [id number]    Department:  [facility] Emergency Department   Date of Visit:  7/30/2018           Disclosure     Insurance plans vary and the physician(s) referred by the ER may not be covered by your plan.  Please contact yo tell this physician (or your personal doctor if your instructions are to return to your personal doctor) about any new or lasting problems. The primary care or specialist physician will see patients referred from the [facility] Emergency Department.  Johan Solano

## (undated) NOTE — Clinical Note
Gretta, I bumped up Paddy's Alprazolam to 0.5 mg, started him on Trazodone, and finally sent him to our in-house  Behavioral health clinician for counseling and coping strategies with stress & anxiety. Kind regards,Dr. Ram Schleswig

## (undated) NOTE — LETTER
Your patient was recently seen at the [facility] for a hospital follow-up visit. The visit note is attached. Please contact the clinic with any questions at [phone].     Thank you,  SABRINA Santos

## (undated) NOTE — LETTER
[facility] [address], [address]  Consent for Procedure/Sedation    Date:     Time:       1. I authorize the performance upon Rianna Wilson the following:  ELECTIVE CARDIOVERSION    2.  I authorize Dr. Steven Worley whomever is desig ________________________________    ___________________    Witness: _________________________      Date: ___________________    Printed: 2019   1:36 PM  Patient Name: Eliot Salas        : 1955       Medical Record #: [id number]

## (undated) NOTE — LETTER
[facility] [address], [address]  Consent for Procedure/Sedation    Date: 12/17/2018    Time: 1630      1. I authorize the performance upon Tevin Allen the following:           right heart catheterization  2.  I authorize  to consent for patient: _________________________ patient: ___________________    Witness: _______________________________ Date: _____________________    Printed: 2018   4:13 PM  Patient Name: Ramesh Valerio        : 1955       [facility]

## (undated) NOTE — LETTER
01/09/20        [id number] [id number]      Dear Arvil Form records indicate that you have outstanding lab work and or testing that was ordered for you and has not yet been completed: Chest Xray -- Please contact [facility]

## (undated) NOTE — LETTER
Date & Time: 12/13/2018, 12:07 PM  Patient: Danyelle Fontan  Encounter Provider(s):    Monica Wilson MD         This certifies that Debora Choi, a patient at an [facility] facility, am leaving the facility voluntarily and aga

## (undated) NOTE — LETTER
Your patient was recently seen at the [facility] for a hospital follow-up visit. The visit note is attached. Please contact the clinic with any questions at [phone].     Thank you,  SABRINA Molina

## (undated) NOTE — LETTER
Date & Time: 12/13/2018, 12:03 PM  Patient: Isela Baker  Encounter Provider(s):    Baudilio Sue MD         This certifies that Radha Raya, a patient at an [facility], am leaving the facility voluntarily and aga